# Patient Record
Sex: MALE | Race: WHITE | NOT HISPANIC OR LATINO | Employment: OTHER | URBAN - METROPOLITAN AREA
[De-identification: names, ages, dates, MRNs, and addresses within clinical notes are randomized per-mention and may not be internally consistent; named-entity substitution may affect disease eponyms.]

---

## 2017-08-31 ENCOUNTER — OFFICE VISIT (OUTPATIENT)
Dept: INTERNAL MEDICINE | Facility: CLINIC | Age: 37
End: 2017-08-31
Payer: COMMERCIAL

## 2017-08-31 ENCOUNTER — TELEPHONE (OUTPATIENT)
Dept: INTERNAL MEDICINE | Facility: CLINIC | Age: 37
End: 2017-08-31

## 2017-08-31 ENCOUNTER — HOSPITAL ENCOUNTER (OUTPATIENT)
Dept: RADIOLOGY | Facility: HOSPITAL | Age: 37
Discharge: HOME | End: 2017-08-31
Attending: NURSE PRACTITIONER
Payer: COMMERCIAL

## 2017-08-31 VITALS
HEIGHT: 70 IN | SYSTOLIC BLOOD PRESSURE: 124 MMHG | OXYGEN SATURATION: 99 % | BODY MASS INDEX: 27.3 KG/M2 | WEIGHT: 190.69 LBS | DIASTOLIC BLOOD PRESSURE: 84 MMHG | TEMPERATURE: 97 F | HEART RATE: 74 BPM

## 2017-08-31 DIAGNOSIS — F17.200 TOBACCO DEPENDENCE: ICD-10-CM

## 2017-08-31 DIAGNOSIS — M79.661 PAIN IN RIGHT LOWER LEG: Primary | ICD-10-CM

## 2017-08-31 DIAGNOSIS — Z00.00 ROUTINE CHECK-UP: ICD-10-CM

## 2017-08-31 DIAGNOSIS — M79.661 PAIN IN RIGHT LOWER LEG: ICD-10-CM

## 2017-08-31 PROCEDURE — 3008F BODY MASS INDEX DOCD: CPT | Mod: S$GLB,,, | Performed by: NURSE PRACTITIONER

## 2017-08-31 PROCEDURE — 73590 X-RAY EXAM OF LOWER LEG: CPT | Mod: 26,RT,, | Performed by: RADIOLOGY

## 2017-08-31 PROCEDURE — 99204 OFFICE O/P NEW MOD 45 MIN: CPT | Mod: S$GLB,,, | Performed by: NURSE PRACTITIONER

## 2017-08-31 PROCEDURE — 73590 X-RAY EXAM OF LOWER LEG: CPT | Mod: TC,PO,RT

## 2017-08-31 PROCEDURE — 99999 PR PBB SHADOW E&M-NEW PATIENT-LVL III: CPT | Mod: PBBFAC,,, | Performed by: NURSE PRACTITIONER

## 2017-08-31 RX ORDER — DICLOFENAC SODIUM 75 MG/1
75 TABLET, DELAYED RELEASE ORAL 2 TIMES DAILY PRN
Qty: 60 TABLET | Refills: 0 | Status: SHIPPED | OUTPATIENT
Start: 2017-08-31 | End: 2018-02-22

## 2017-08-31 NOTE — TELEPHONE ENCOUNTER
Notified of the xray results,   Will ice the area and take diclofenac.  If not improved will consider ortho

## 2017-08-31 NOTE — PROGRESS NOTES
"Subjective:      Patient ID: Balta Padilla is a 37 y.o. male.    Chief Complaint: Leg Pain (right shin/swelling)    HPI:  Patient states for the last 3 weeks he has noticed a mild swelling and tenderness to his right lower leg.  He denies any trauma, falls, or injury.  He has taken Aleve at home as he always does for aches and pains.  He is also interested in smoking cessation.   He is seeking a new pcp    No past medical history on file.    Past Surgical History:   Procedure Laterality Date    CARPAL TUNNEL RELEASE Right     knee sx      mensicus repair       No results found for: WBC, HGB, HCT, PLT, CHOL, TRIG, HDL, LDLDIRECT, ALT, AST, NA, K, CL, CREATININE, BUN, CO2, TSH, PSA, INR, GLUF, HGBA1C, MICROALBUR    /84 (BP Location: Left arm, Patient Position: Sitting, BP Method: Medium (Manual))   Pulse 74   Temp 97.4 °F (36.3 °C) (Tympanic)   Ht 5' 10" (1.778 m)   Wt 86.5 kg (190 lb 11.2 oz)   SpO2 99%   BMI 27.36 kg/m²       Review of Systems   Constitutional: Negative for appetite change, chills, diaphoresis and fever.   HENT: Negative for congestion, ear pain, postnasal drip, rhinorrhea, sneezing, sore throat and trouble swallowing.    Eyes: Negative for photophobia, pain and visual disturbance.   Respiratory: Negative for apnea, cough, choking, chest tightness, shortness of breath and wheezing.    Cardiovascular: Negative for chest pain, palpitations and leg swelling.   Gastrointestinal: Negative for abdominal pain, constipation, diarrhea, nausea and vomiting.   Genitourinary: Negative for decreased urine volume, difficulty urinating, dysuria, hematuria and urgency.   Musculoskeletal: Positive for arthralgias. Negative for gait problem, joint swelling and myalgias.   Skin: Negative for rash.   Neurological: Negative for dizziness, tremors, seizures, syncope, weakness, light-headedness, numbness and headaches.   Psychiatric/Behavioral: Negative for agitation, confusion, decreased concentration, " hallucinations and sleep disturbance. The patient is not nervous/anxious.       Objective:     Physical Exam   Constitutional: He is oriented to person, place, and time. He appears well-developed and well-nourished. No distress.   Musculoskeletal:   Normal gait  Right lower anterior leg very mild swelling noted, not TTP, but has pain to the lower leg with foot flexion   Neurological: He is alert and oriented to person, place, and time.   Skin: Skin is warm and dry.   Psychiatric: He has a normal mood and affect. His behavior is normal.     Assessment:      1. Pain in right lower leg    2. Routine check-up    3. Tobacco dependence      Plan:   Pain in right lower leg  -     X-Ray Tibia Fibula 2 View Right; Future; Expected date: 08/31/2017    Routine check-up  -     TSH; Future; Expected date: 08/31/2017  -     CBC auto differential; Future; Expected date: 08/31/2017  -     Lipid panel; Future; Expected date: 08/31/2017  -     Comprehensive metabolic panel; Future; Expected date: 08/31/2017    Tobacco dependence  -     Ambulatory referral to Smoking Cessation Program    Other orders  -     diclofenac (VOLTAREN) 75 MG EC tablet; Take 1 tablet (75 mg total) by mouth 2 (two) times daily as needed.  Dispense: 60 tablet; Refill: 0      Will call the xray result to him.  Will try the above med instead of the aleve, ice the area as much as he can.    Current Outpatient Prescriptions:     aspirin-caffeine 845-65 mg PwPk, Take 1-2 packets by mouth daily as needed., Disp: , Rfl:     DOXYLAMINE SUCCINATE (UNISOM, DOXYLAMINE, ORAL), Take 3 tablets by mouth every evening., Disp: , Rfl:     LORATADINE (CLARITIN ORAL), Take 1 tablet by mouth once daily., Disp: , Rfl:     diclofenac (VOLTAREN) 75 MG EC tablet, Take 1 tablet (75 mg total) by mouth 2 (two) times daily as needed., Disp: 60 tablet, Rfl: 0

## 2017-09-13 ENCOUNTER — LAB VISIT (OUTPATIENT)
Dept: LAB | Facility: HOSPITAL | Age: 37
End: 2017-09-13
Attending: INTERNAL MEDICINE
Payer: COMMERCIAL

## 2017-09-13 DIAGNOSIS — Z00.00 ROUTINE CHECK-UP: ICD-10-CM

## 2017-09-13 LAB
ALBUMIN SERPL BCP-MCNC: 3.4 G/DL
ALP SERPL-CCNC: 79 U/L
ALT SERPL W/O P-5'-P-CCNC: 25 U/L
ANION GAP SERPL CALC-SCNC: 9 MMOL/L
AST SERPL-CCNC: 24 U/L
BASOPHILS # BLD AUTO: 0.03 K/UL
BASOPHILS NFR BLD: 0.4 %
BILIRUB SERPL-MCNC: 0.5 MG/DL
BUN SERPL-MCNC: 15 MG/DL
CALCIUM SERPL-MCNC: 9.1 MG/DL
CHLORIDE SERPL-SCNC: 106 MMOL/L
CHOLEST SERPL-MCNC: 203 MG/DL
CHOLEST/HDLC SERPL: 4.5 {RATIO}
CO2 SERPL-SCNC: 27 MMOL/L
CREAT SERPL-MCNC: 0.9 MG/DL
DIFFERENTIAL METHOD: NORMAL
EOSINOPHIL # BLD AUTO: 0.3 K/UL
EOSINOPHIL NFR BLD: 3.8 %
ERYTHROCYTE [DISTWIDTH] IN BLOOD BY AUTOMATED COUNT: 13.5 %
EST. GFR  (AFRICAN AMERICAN): >60 ML/MIN/1.73 M^2
EST. GFR  (NON AFRICAN AMERICAN): >60 ML/MIN/1.73 M^2
GLUCOSE SERPL-MCNC: 86 MG/DL
HCT VFR BLD AUTO: 43.2 %
HDLC SERPL-MCNC: 45 MG/DL
HDLC SERPL: 22.2 %
HGB BLD-MCNC: 14.6 G/DL
LDLC SERPL CALC-MCNC: 136.6 MG/DL
LYMPHOCYTES # BLD AUTO: 2.1 K/UL
LYMPHOCYTES NFR BLD: 28.6 %
MCH RBC QN AUTO: 30.9 PG
MCHC RBC AUTO-ENTMCNC: 33.8 G/DL
MCV RBC AUTO: 91 FL
MONOCYTES # BLD AUTO: 0.7 K/UL
MONOCYTES NFR BLD: 9.8 %
NEUTROPHILS # BLD AUTO: 4.2 K/UL
NEUTROPHILS NFR BLD: 57.1 %
NONHDLC SERPL-MCNC: 158 MG/DL
PLATELET # BLD AUTO: 320 K/UL
PMV BLD AUTO: 10.7 FL
POTASSIUM SERPL-SCNC: 4.4 MMOL/L
PROT SERPL-MCNC: 6.8 G/DL
RBC # BLD AUTO: 4.73 M/UL
SODIUM SERPL-SCNC: 142 MMOL/L
TRIGL SERPL-MCNC: 107 MG/DL
TSH SERPL DL<=0.005 MIU/L-ACNC: 0.99 UIU/ML
WBC # BLD AUTO: 7.32 K/UL

## 2017-09-13 PROCEDURE — 36415 COLL VENOUS BLD VENIPUNCTURE: CPT | Mod: PO

## 2017-09-13 PROCEDURE — 80061 LIPID PANEL: CPT

## 2017-09-13 PROCEDURE — 84443 ASSAY THYROID STIM HORMONE: CPT

## 2017-09-13 PROCEDURE — 80053 COMPREHEN METABOLIC PANEL: CPT

## 2017-09-13 PROCEDURE — 85025 COMPLETE CBC W/AUTO DIFF WBC: CPT

## 2017-09-20 ENCOUNTER — OFFICE VISIT (OUTPATIENT)
Dept: INTERNAL MEDICINE | Facility: CLINIC | Age: 37
End: 2017-09-20
Payer: COMMERCIAL

## 2017-09-20 VITALS
DIASTOLIC BLOOD PRESSURE: 82 MMHG | OXYGEN SATURATION: 100 % | WEIGHT: 186.5 LBS | HEIGHT: 70 IN | HEART RATE: 75 BPM | BODY MASS INDEX: 26.7 KG/M2 | SYSTOLIC BLOOD PRESSURE: 124 MMHG | TEMPERATURE: 96 F

## 2017-09-20 DIAGNOSIS — Z00.00 ROUTINE GENERAL MEDICAL EXAMINATION AT A HEALTH CARE FACILITY: Primary | ICD-10-CM

## 2017-09-20 DIAGNOSIS — F17.200 TOBACCO DEPENDENCE: ICD-10-CM

## 2017-09-20 PROCEDURE — 99395 PREV VISIT EST AGE 18-39: CPT | Mod: S$GLB,,, | Performed by: INTERNAL MEDICINE

## 2017-09-20 PROCEDURE — 99999 PR PBB SHADOW E&M-EST. PATIENT-LVL III: CPT | Mod: PBBFAC,,, | Performed by: INTERNAL MEDICINE

## 2017-09-20 NOTE — PROGRESS NOTES
"HPI:  Pt comes for his initial visit to establish care. He has no complaints. He has no significant PMHx.       Current MEDS: medcard review, verified and update  Allergies: Per the electronic medical record    History reviewed. No pertinent past medical history.    Past Surgical History:   Procedure Laterality Date    CARPAL TUNNEL RELEASE Right     knee sx      mensicus repair       SHx: per the electronic medical record    FHx: recorded in the electronic medical record    ROS:    denies any chest pains or shortness of breath. Denies any nausea, vomiting or diarrhea. Denies any fever, chills or sweats. Denies any change in weight, voice, stool, skin or hair. Denies any dysuria, dyspepsia or dysphagia. Denies any change in vision, hearing or headaches. Denies any swollen lymph nodes or loss of memory.    PE:  /82   Pulse 75   Temp 96.4 °F (35.8 °C) (Tympanic)   Ht 5' 10" (1.778 m)   Wt 84.6 kg (186 lb 8.2 oz)   SpO2 100%   BMI 26.76 kg/m²   Gen: Well-developed, well-nourished, male, in no acute distress, oriented x3  HEENT: neck is supple, no adenopathy, carotids 2+ equal without bruits, thyroid exam normal size without nodules.  CHEST: clear to auscultation and percussion  CVS: regular rate and rhythm without significant murmur, gallop, or rubs  ABD: soft, benign, no rebound no guarding, no distention.  Bowel sounds are normal.     nontender.  No palpable masses.  No organomegaly and no audible bruits.  RECTAL: deferred  EXT: no clubbing, cyanosis, or edema  LYMPH: no cervical, inguinal, or axillary adenopathy  FEET: no loss of sensation.  No ulcers or pressure sores.  NEURO: gait normal.  Cranial nerves II- XII intact. No nystagmus.  Speech normal.   Gross motor and sensory unremarkable.    Lab Results   Component Value Date    WBC 7.32 09/13/2017    HGB 14.6 09/13/2017    HCT 43.2 09/13/2017     09/13/2017    CHOL 203 (H) 09/13/2017    TRIG 107 09/13/2017    HDL 45 09/13/2017    ALT 25 " 09/13/2017    AST 24 09/13/2017     09/13/2017    K 4.4 09/13/2017     09/13/2017    CREATININE 0.9 09/13/2017    BUN 15 09/13/2017    CO2 27 09/13/2017    TSH 0.994 09/13/2017       Impression:  Healthy 36 y/o male    Plan:      See me in three years for regular PE or o/w as needed.

## 2017-09-20 NOTE — LETTER
September 20, 2017      Juan Mora, NALLELY  [address] [facility] [id number] [place] [address] Internal [facility]  [address] [facility] [address]  Phone: [phone]          Patient: Balta Padilla   MR Number: [id number]   YOB: 1980   Date of Visit: 9/20/2017       Dear Juan Mora:    Thank you for referring Balta Padilla to me for evaluation. Attached you will find relevant portions of my assessment and plan of care.    If you have questions, please do not hesitate to call me. I look forward to following Balta Padilla along with you.    Sincerely,    Royal Dunham MD    Enclosure  CC:  No Recipients    If you would like to receive this communication electronically, please contact [email or web address] or [phone] to request more information on [employer] Link access.    For providers and/or their staff who would like to refer a patient to [facility], please contact us through our one-stop-shop provider referral line, [facility], at [phone].    If you feel you have received this communication in error or would no longer like to receive these types of communications, please e-mail [email or web address]

## 2018-02-22 ENCOUNTER — OFFICE VISIT (OUTPATIENT)
Dept: INTERNAL MEDICINE | Facility: CLINIC | Age: 38
End: 2018-02-22
Payer: COMMERCIAL

## 2018-02-22 VITALS
SYSTOLIC BLOOD PRESSURE: 108 MMHG | BODY MASS INDEX: 26.35 KG/M2 | OXYGEN SATURATION: 99 % | DIASTOLIC BLOOD PRESSURE: 59 MMHG | HEIGHT: 70 IN | TEMPERATURE: 97 F | WEIGHT: 184.06 LBS | HEART RATE: 84 BPM

## 2018-02-22 DIAGNOSIS — F32.9 REACTIVE DEPRESSION: ICD-10-CM

## 2018-02-22 PROBLEM — F32.A DEPRESSION: Status: ACTIVE | Noted: 2018-02-22

## 2018-02-22 PROCEDURE — 99999 PR PBB SHADOW E&M-EST. PATIENT-LVL III: CPT | Mod: PBBFAC,,, | Performed by: NURSE PRACTITIONER

## 2018-02-22 PROCEDURE — 99214 OFFICE O/P EST MOD 30 MIN: CPT | Mod: S$GLB,,, | Performed by: NURSE PRACTITIONER

## 2018-02-22 PROCEDURE — 3008F BODY MASS INDEX DOCD: CPT | Mod: S$GLB,,, | Performed by: NURSE PRACTITIONER

## 2018-02-22 RX ORDER — ESCITALOPRAM OXALATE 10 MG/1
10 TABLET ORAL DAILY
Qty: 30 TABLET | Refills: 11 | Status: SHIPPED | OUTPATIENT
Start: 2018-02-22 | End: 2018-10-29
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
## 2018-03-01 ENCOUNTER — PATIENT OUTREACH (OUTPATIENT)
Dept: ADMINISTRATIVE | Facility: HOSPITAL | Age: 38
End: 2018-03-01

## 2018-10-10 ENCOUNTER — OFFICE VISIT (OUTPATIENT)
Dept: INTERNAL MEDICINE | Facility: CLINIC | Age: 38
End: 2018-10-10
Payer: COMMERCIAL

## 2018-10-10 VITALS
HEIGHT: 70 IN | TEMPERATURE: 98 F | SYSTOLIC BLOOD PRESSURE: 110 MMHG | BODY MASS INDEX: 26.22 KG/M2 | OXYGEN SATURATION: 99 % | DIASTOLIC BLOOD PRESSURE: 80 MMHG | WEIGHT: 183.19 LBS | HEART RATE: 83 BPM

## 2018-10-10 DIAGNOSIS — M79.5 FOREIGN BODY (FB) IN SOFT TISSUE: Primary | ICD-10-CM

## 2018-10-10 PROCEDURE — 3008F BODY MASS INDEX DOCD: CPT | Mod: CPTII,S$GLB,, | Performed by: INTERNAL MEDICINE

## 2018-10-10 PROCEDURE — 99999 PR PBB SHADOW E&M-EST. PATIENT-LVL III: CPT | Mod: PBBFAC,,, | Performed by: INTERNAL MEDICINE

## 2018-10-10 PROCEDURE — 99213 OFFICE O/P EST LOW 20 MIN: CPT | Mod: S$GLB,,, | Performed by: INTERNAL MEDICINE
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
## 2018-10-15 ENCOUNTER — OFFICE VISIT (OUTPATIENT)
Dept: SURGERY | Facility: CLINIC | Age: 38
End: 2018-10-15
Payer: COMMERCIAL

## 2018-10-15 VITALS
TEMPERATURE: 99 F | BODY MASS INDEX: 24.74 KG/M2 | HEIGHT: 70 IN | HEART RATE: 95 BPM | DIASTOLIC BLOOD PRESSURE: 62 MMHG | WEIGHT: 172.81 LBS | SYSTOLIC BLOOD PRESSURE: 123 MMHG

## 2018-10-15 DIAGNOSIS — S50.852A FOREIGN BODY IN LEFT FOREARM, INITIAL ENCOUNTER: Primary | ICD-10-CM

## 2018-10-15 PROCEDURE — 99999 PR PBB SHADOW E&M-EST. PATIENT-LVL III: CPT | Mod: PBBFAC,,, | Performed by: SURGERY

## 2018-10-15 PROCEDURE — 3008F BODY MASS INDEX DOCD: CPT | Mod: CPTII,S$GLB,, | Performed by: SURGERY

## 2018-10-15 PROCEDURE — 99203 OFFICE O/P NEW LOW 30 MIN: CPT | Mod: S$GLB,,, | Performed by: SURGERY

## 2018-10-15 RX ORDER — NAPROXEN SODIUM 220 MG
220 TABLET ORAL
COMMUNITY
End: 2019-01-29
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
## 2018-10-15 NOTE — LETTER
October 15, 2018      Royal Dunham MD  [address]  [address]  [place] [address]           [facility]  [address] [place] [address]  Phone: [phone]  Fax: [phone]          Patient: Pravin Padilla   MR Number: [id number]   YOB: 1980   Date of Visit: 10/15/2018       Dear Dr. Royal Dunham:    Thank you for referring Pravin Padilla to me for evaluation. Attached you will find relevant portions of my assessment and plan of care.    If you have questions, please do not hesitate to call me. I look forward to following Pravin Padilla along with you.    Sincerely,    Enrique Dugan MD    Enclosure  CC:  No Recipients    If you would like to receive this communication electronically, please contact [email or web address] or [phone] to request more information on [employer] Link access.    For providers and/or their staff who would like to refer a patient to [facility], please contact us through our one-stop-shop provider referral line, [facility], at [phone].    If you feel you have received this communication in error or would no longer like to receive these types of communications, please e-mail [email or web address]

## 2018-10-29 ENCOUNTER — PROCEDURE VISIT (OUTPATIENT)
Dept: SURGERY | Facility: CLINIC | Age: 38
End: 2018-10-29
Payer: COMMERCIAL

## 2018-10-29 VITALS
HEIGHT: 70 IN | BODY MASS INDEX: 26.33 KG/M2 | WEIGHT: 183.88 LBS | SYSTOLIC BLOOD PRESSURE: 129 MMHG | TEMPERATURE: 98 F | DIASTOLIC BLOOD PRESSURE: 79 MMHG | HEART RATE: 98 BPM

## 2018-10-29 DIAGNOSIS — M79.5 FOREIGN BODY (FB) IN SOFT TISSUE: Primary | ICD-10-CM

## 2018-10-29 PROCEDURE — 10121 INC&RMVL FB SUBQ TISS COMP: CPT | Mod: S$GLB,,, | Performed by: SURGERY

## 2018-10-29 NOTE — PROCEDURES
"Exc, Foreign Body  Date/Time: 10/29/2018 2:15 PM  Performed by: Enrique Dugan MD  Authorized by: Enrique Dugan MD     Consent Done?:  Yes (Written)  Time out: Immediately prior to procedure a "time out" was called to verify the correct patient, procedure, equipment, support staff and site/side marked as required.      STAFF:  Assistants?: No    I was present for the entire procedure.  INDICATIONS:: Foreign body.  LOCATION:  Body area:  Lower arm / wristleft    PREP:  Patient was prepped and draped in the normal sterile fashion.Position:  Supine    ANESTHESIA:  Anesthesia:  Local infiltration  Local anesthetic:  Lidocaine 1% with epinephrine    PROCEDURE DETAILS:  Excision type:  Foreign body removal  Excision size (cm):  2  Scalpel size:  15  Incision type:  Single straight  Specimens?: No      Hemostasis was obtained.  Estimated blood loss (cc):  2  Wound closure:  Intermediate layered  Wound repair size (cm):  2  Sutures:  3-0 Vicryl and 4-0 Monocryl  Sterile dressings:  Gauze, Mastisol, Steri-strips and Tegaderm  Post-op diagnosis: Same as pre-op diagnosis.  Needle, instrument, and sponge counts were correct.  Patient tolerated the procedure well with no immediate complications.  Post-operative instructions were provided for the patient.  Patient was discharged and will follow up for wound check.      "

## 2019-01-29 ENCOUNTER — OFFICE VISIT (OUTPATIENT)
Dept: INTERNAL MEDICINE | Facility: CLINIC | Age: 39
End: 2019-01-29
Payer: COMMERCIAL

## 2019-01-29 ENCOUNTER — HOSPITAL ENCOUNTER (OUTPATIENT)
Dept: RADIOLOGY | Facility: HOSPITAL | Age: 39
Discharge: HOME | End: 2019-01-29
Attending: FAMILY MEDICINE
Payer: COMMERCIAL

## 2019-01-29 VITALS
HEART RATE: 94 BPM | OXYGEN SATURATION: 98 % | WEIGHT: 195.56 LBS | SYSTOLIC BLOOD PRESSURE: 136 MMHG | TEMPERATURE: 99 F | DIASTOLIC BLOOD PRESSURE: 84 MMHG | HEIGHT: 70 IN | BODY MASS INDEX: 28 KG/M2

## 2019-01-29 DIAGNOSIS — S61.012D LACERATION OF LEFT THUMB WITHOUT FOREIGN BODY WITHOUT DAMAGE TO NAIL, SUBSEQUENT ENCOUNTER: ICD-10-CM

## 2019-01-29 DIAGNOSIS — S61.012D LACERATION OF LEFT THUMB WITHOUT FOREIGN BODY WITHOUT DAMAGE TO NAIL, SUBSEQUENT ENCOUNTER: Primary | ICD-10-CM

## 2019-01-29 PROCEDURE — 99999 PR PBB SHADOW E&M-EST. PATIENT-LVL III: ICD-10-PCS | Mod: PBBFAC,,, | Performed by: FAMILY MEDICINE

## 2019-01-29 PROCEDURE — 3008F BODY MASS INDEX DOCD: CPT | Mod: CPTII,S$GLB,, | Performed by: FAMILY MEDICINE

## 2019-01-29 PROCEDURE — 73140 X-RAY EXAM OF FINGER(S): CPT | Mod: 26,LT,, | Performed by: RADIOLOGY

## 2019-01-29 PROCEDURE — 73140 XR FINGER 2 OR MORE VIEWS LEFT: ICD-10-PCS | Mod: 26,LT,, | Performed by: RADIOLOGY

## 2019-01-29 PROCEDURE — 73140 X-RAY EXAM OF FINGER(S): CPT | Mod: TC,PO,LT

## 2019-01-29 PROCEDURE — 3008F PR BODY MASS INDEX (BMI) DOCUMENTED: ICD-10-PCS | Mod: CPTII,S$GLB,, | Performed by: FAMILY MEDICINE

## 2019-01-29 PROCEDURE — 99213 PR OFFICE/OUTPT VISIT, EST, LEVL III, 20-29 MIN: ICD-10-PCS | Mod: S$GLB,,, | Performed by: FAMILY MEDICINE

## 2019-01-29 PROCEDURE — 99213 OFFICE O/P EST LOW 20 MIN: CPT | Mod: S$GLB,,, | Performed by: FAMILY MEDICINE

## 2019-01-29 PROCEDURE — 99999 PR PBB SHADOW E&M-EST. PATIENT-LVL III: CPT | Mod: PBBFAC,,, | Performed by: FAMILY MEDICINE

## 2019-01-29 RX ORDER — HYDROCODONE BITARTRATE AND ACETAMINOPHEN 10; 325 MG/1; MG/1
1 TABLET ORAL EVERY 6 HOURS PRN
Qty: 18 TABLET | Refills: 0 | Status: SHIPPED | OUTPATIENT
Start: 2019-01-29 | End: 2019-02-05 | Stop reason: SDUPTHER

## 2019-01-29 RX ORDER — CEPHALEXIN 500 MG/1
500 CAPSULE ORAL
COMMUNITY
Start: 2019-01-24 | End: 2019-02-03

## 2019-01-29 NOTE — PROGRESS NOTES
Subjective:      Patient ID: Pravin Padilla is a 38 y.o. male.    Chief Complaint: Wound on finger      Patient here today for follow up on ER visit for left thumb laceration to fat pad of thumb. He was seen 5 days ago after injury using skill saw, wound was cleaned thoroughly and cauterized with silver nitrate. Currently taking cephalexin without any problems, however having severe pain in thumb and hand down to wrist.      Review of Systems   Constitutional: Negative for fever.   Skin: Positive for color change (skin black from cauterization) and wound.     History reviewed. No pertinent past medical history.  Past Surgical History:   Procedure Laterality Date    CARPAL TUNNEL RELEASE Right     knee sx      mensicus repair     Family History   Problem Relation Age of Onset    Diabetes Mother     Prostate cancer Maternal Grandfather      Social History     Socioeconomic History    Marital status:      Spouse name: Not on file    Number of children: Not on file    Years of education: Not on file    Highest education level: Not on file   Social Needs    Financial resource strain: Not on file    Food insecurity - worry: Not on file    Food insecurity - inability: Not on file    Transportation needs - medical: Not on file    Transportation needs - non-medical: Not on file   Occupational History    Not on file   Tobacco Use    Smoking status: Current Every Day Smoker     Packs/day: 1.00     Years: 25.00     Pack years: 25.00     Types: Cigarettes     Start date: 1/31/1992    Smokeless tobacco: Never Used   Substance and Sexual Activity    Alcohol use: Yes     Alcohol/week: 9.0 oz     Types: 15 Shots of liquor per week     Comment: social weekly 4 wiskey    Drug use: No    Sexual activity: Yes     Partners: Female   Other Topics Concern    Not on file   Social History Narrative    Not on file     Review of patient's allergies indicates:  No Known Allergies    Objective:       /84  "(BP Location: Right arm)   Pulse 94   Temp 99.4 °F (37.4 °C) (Tympanic)   Ht 5' 9.5" (1.765 m)   Wt 88.7 kg (195 lb 8.8 oz)   SpO2 98%   BMI 28.46 kg/m²   Physical Exam   Constitutional: He appears well-developed and well-nourished. No distress.   Musculoskeletal: Normal range of motion. He exhibits tenderness (entire left thumb) and deformity.   Neurological: No sensory deficit (sensation intact in thumb).   Skin: He is not diaphoretic.   No erythema or signs of infection  Skin black from cauterization   Vitals reviewed.    Assessment:     1. Laceration of left thumb without foreign body without damage to nail, subsequent encounter      Plan:   Laceration of left thumb without foreign body without damage to nail, subsequent encounter  -     X-Ray Finger 2 or More Views Left; Future; Expected date: 01/29/2019    Other orders  -     HYDROcodone-acetaminophen (NORCO)  mg per tablet; Take 1 tablet by mouth every 6 (six) hours as needed for Pain.  Dispense: 18 tablet; Refill: 0         Medication List           Accurate as of 1/29/19  3:51 PM. If you have any questions, ask your nurse or doctor.               START taking these medications    HYDROcodone-acetaminophen  mg per tablet  Commonly known as:  NORCO  Take 1 tablet by mouth every 6 (six) hours as needed for Pain.  Started by:  Cecile Knowles MD        CONTINUE taking these medications    aspirin-caffeine 845-65 mg Pwpk     cephALEXin 500 MG capsule  Commonly known as:  KEFLEX     UNISOM (DOXYLAMINE) ORAL        STOP taking these medications    CLARITIN ORAL  Stopped by:  Cecile Knowles MD     naproxen sodium 220 MG tablet  Commonly known as:  ANAPROX  Stopped by:  Cecile Knowles MD           Where to Get Your Medications      These medications were sent to [facility] [address] [facility] [address] [facility] [address] [facility]  [address], [facility] [address]    Phone:  " [phone]   · HYDROcodone-acetaminophen  mg per tablet

## 2019-02-05 ENCOUNTER — OFFICE VISIT (OUTPATIENT)
Dept: INTERNAL MEDICINE | Facility: CLINIC | Age: 39
End: 2019-02-05
Payer: COMMERCIAL

## 2019-02-05 VITALS
OXYGEN SATURATION: 98 % | SYSTOLIC BLOOD PRESSURE: 120 MMHG | HEIGHT: 69 IN | DIASTOLIC BLOOD PRESSURE: 73 MMHG | BODY MASS INDEX: 28.54 KG/M2 | TEMPERATURE: 98 F | HEART RATE: 87 BPM | WEIGHT: 192.69 LBS

## 2019-02-05 DIAGNOSIS — L03.90 CELLULITIS, UNSPECIFIED CELLULITIS SITE: ICD-10-CM

## 2019-02-05 DIAGNOSIS — S61.012D LACERATION OF LEFT THUMB WITHOUT FOREIGN BODY WITHOUT DAMAGE TO NAIL, SUBSEQUENT ENCOUNTER: Primary | ICD-10-CM

## 2019-02-05 PROCEDURE — 99999 PR PBB SHADOW E&M-EST. PATIENT-LVL III: ICD-10-PCS | Mod: PBBFAC,,, | Performed by: FAMILY MEDICINE

## 2019-02-05 PROCEDURE — 99999 PR PBB SHADOW E&M-EST. PATIENT-LVL III: CPT | Mod: PBBFAC,,, | Performed by: FAMILY MEDICINE

## 2019-02-05 PROCEDURE — 99213 OFFICE O/P EST LOW 20 MIN: CPT | Mod: S$GLB,,, | Performed by: FAMILY MEDICINE

## 2019-02-05 PROCEDURE — 99213 PR OFFICE/OUTPT VISIT, EST, LEVL III, 20-29 MIN: ICD-10-PCS | Mod: S$GLB,,, | Performed by: FAMILY MEDICINE

## 2019-02-05 PROCEDURE — 3008F BODY MASS INDEX DOCD: CPT | Mod: CPTII,S$GLB,, | Performed by: FAMILY MEDICINE

## 2019-02-05 PROCEDURE — 3008F PR BODY MASS INDEX (BMI) DOCUMENTED: ICD-10-PCS | Mod: CPTII,S$GLB,, | Performed by: FAMILY MEDICINE

## 2019-02-05 RX ORDER — MUPIROCIN 20 MG/G
OINTMENT TOPICAL 2 TIMES DAILY
Qty: 30 G | Refills: 1 | Status: SHIPPED | OUTPATIENT
Start: 2019-02-05 | End: 2019-04-01

## 2019-02-05 RX ORDER — SULFAMETHOXAZOLE AND TRIMETHOPRIM 800; 160 MG/1; MG/1
1 TABLET ORAL 2 TIMES DAILY
Qty: 20 TABLET | Refills: 0 | Status: SHIPPED | OUTPATIENT
Start: 2019-02-05 | End: 2019-04-01

## 2019-02-05 RX ORDER — HYDROCODONE BITARTRATE AND ACETAMINOPHEN 10; 325 MG/1; MG/1
1 TABLET ORAL EVERY 6 HOURS PRN
Qty: 18 TABLET | Refills: 0 | Status: SHIPPED | OUTPATIENT
Start: 2019-02-05 | End: 2019-04-01

## 2019-02-05 NOTE — PROGRESS NOTES
Subjective:      Patient ID: Pravin Padilla is a 38 y.o. male.    Chief Complaint: Hand Pain (left thumb infection)      Patient reports his finger may be getting infected. Cauterized area broke up and so now has open wound on thumb, starting to have more pain and swelling, occasionally can have white milky drainage from open wound. He has not been keeping it covered, was told in the ER to keep wound open and dry.       Review of Systems   Constitutional: Negative for fatigue and fever.   Skin: Positive for color change and wound.     History reviewed. No pertinent past medical history.  Past Surgical History:   Procedure Laterality Date    CARPAL TUNNEL RELEASE Right     knee sx      mensicus repair     Family History   Problem Relation Age of Onset    Diabetes Mother     Prostate cancer Maternal Grandfather      Social History     Socioeconomic History    Marital status:      Spouse name: Not on file    Number of children: Not on file    Years of education: Not on file    Highest education level: Not on file   Social Needs    Financial resource strain: Not on file    Food insecurity - worry: Not on file    Food insecurity - inability: Not on file    Transportation needs - medical: Not on file    Transportation needs - non-medical: Not on file   Occupational History    Not on file   Tobacco Use    Smoking status: Current Every Day Smoker     Packs/day: 1.00     Years: 25.00     Pack years: 25.00     Types: Cigarettes     Start date: 1/31/1992    Smokeless tobacco: Never Used   Substance and Sexual Activity    Alcohol use: Yes     Alcohol/week: 9.0 oz     Types: 15 Shots of liquor per week     Comment: social weekly 4 wiskey    Drug use: No    Sexual activity: Yes     Partners: Female   Other Topics Concern    Not on file   Social History Narrative    Not on file     Review of patient's allergies indicates:  No Known Allergies    Objective:       /73 (BP Location: Right arm,  "Patient Position: Sitting, BP Method: Medium (Automatic))   Pulse 87   Temp 98.3 °F (36.8 °C) (Tympanic)   Ht 5' 9.25" (1.759 m)   Wt 87.4 kg (192 lb 10.9 oz)   SpO2 98%   BMI 28.25 kg/m²   Physical Exam   Constitutional: He appears well-developed and well-nourished. No distress.   Skin: He is not diaphoretic.   Thumb with swelling, mild erythema just proximal to the wound. No purulent discharge able to be expressed today.    Vitals reviewed.    Assessment:     1. Laceration of left thumb without foreign body without damage to nail, subsequent encounter    2. Cellulitis, unspecified cellulitis site      Plan:   Laceration of left thumb without foreign body without damage to nail, subsequent encounter    Cellulitis, unspecified cellulitis site    Other orders  -     sulfamethoxazole-trimethoprim 800-160mg (BACTRIM DS) 800-160 mg Tab; Take 1 tablet by mouth 2 (two) times daily.  Dispense: 20 tablet; Refill: 0  -     HYDROcodone-acetaminophen (NORCO)  mg per tablet; Take 1 tablet by mouth every 6 (six) hours as needed for Pain.  Dispense: 18 tablet; Refill: 0  -     mupirocin (BACTROBAN) 2 % ointment; Apply topically 2 (two) times daily.  Dispense: 30 g; Refill: 1    Warm salt water soaks, keep wound covered with antibiotic ointment and bandage especially when at work.   Medication List with Changes/Refills   New Medications    MUPIROCIN (BACTROBAN) 2 % OINTMENT    Apply topically 2 (two) times daily.    SULFAMETHOXAZOLE-TRIMETHOPRIM 800-160MG (BACTRIM DS) 800-160 MG TAB    Take 1 tablet by mouth 2 (two) times daily.   Current Medications    ASPIRIN-CAFFEINE 845-65 MG PWPK    Take 1-2 packets by mouth daily as needed.    DOXYLAMINE SUCCINATE (UNISOM, DOXYLAMINE, ORAL)    Take 3 tablets by mouth every evening.   Changed and/or Refilled Medications    Modified Medication Previous Medication    HYDROCODONE-ACETAMINOPHEN (NORCO)  MG PER TABLET HYDROcodone-acetaminophen (NORCO)  mg per tablet       " Take 1 tablet by mouth every 6 (six) hours as needed for Pain.    Take 1 tablet by mouth every 6 (six) hours as needed for Pain.

## 2019-04-01 ENCOUNTER — OFFICE VISIT (OUTPATIENT)
Dept: INTERNAL MEDICINE | Facility: CLINIC | Age: 39
End: 2019-04-01
Payer: COMMERCIAL

## 2019-04-01 ENCOUNTER — HOSPITAL ENCOUNTER (OUTPATIENT)
Dept: RADIOLOGY | Facility: HOSPITAL | Age: 39
Discharge: HOME | End: 2019-04-01
Attending: FAMILY MEDICINE
Payer: COMMERCIAL

## 2019-04-01 VITALS
SYSTOLIC BLOOD PRESSURE: 118 MMHG | RESPIRATION RATE: 16 BRPM | DIASTOLIC BLOOD PRESSURE: 70 MMHG | HEART RATE: 100 BPM | TEMPERATURE: 98 F | OXYGEN SATURATION: 98 % | WEIGHT: 196.44 LBS | HEIGHT: 69 IN | BODY MASS INDEX: 29.09 KG/M2

## 2019-04-01 DIAGNOSIS — M25.442 SWELLING OF HAND JOINT, LEFT: ICD-10-CM

## 2019-04-01 DIAGNOSIS — Z53.21 PROCEDURE AND TREATMENT NOT CARRIED OUT DUE TO PATIENT LEAVING PRIOR TO BEING SEEN BY HEALTH CARE PROVIDER: Primary | ICD-10-CM

## 2019-04-01 DIAGNOSIS — M10.9 ACUTE GOUT OF LEFT HAND, UNSPECIFIED CAUSE: Primary | ICD-10-CM

## 2019-04-01 PROCEDURE — 73130 X-RAY EXAM OF HAND: CPT | Mod: TC,PO,LT

## 2019-04-01 PROCEDURE — 99213 OFFICE O/P EST LOW 20 MIN: CPT | Mod: S$GLB,,, | Performed by: FAMILY MEDICINE

## 2019-04-01 PROCEDURE — 99499 UNLISTED E&M SERVICE: CPT | Mod: S$GLB,,, | Performed by: FAMILY MEDICINE

## 2019-04-01 PROCEDURE — 73130 X-RAY EXAM OF HAND: CPT | Mod: 26,LT,, | Performed by: RADIOLOGY

## 2019-04-01 PROCEDURE — 3008F BODY MASS INDEX DOCD: CPT | Mod: CPTII,S$GLB,, | Performed by: FAMILY MEDICINE

## 2019-04-01 PROCEDURE — 99999 PR PBB SHADOW E&M-EST. PATIENT-LVL IV: CPT | Mod: PBBFAC,,, | Performed by: FAMILY MEDICINE

## 2019-04-01 PROCEDURE — 73130 XR HAND COMPLETE 3 VIEW LEFT: ICD-10-PCS | Mod: 26,LT,, | Performed by: RADIOLOGY

## 2019-04-01 PROCEDURE — 99213 PR OFFICE/OUTPT VISIT, EST, LEVL III, 20-29 MIN: ICD-10-PCS | Mod: S$GLB,,, | Performed by: FAMILY MEDICINE

## 2019-04-01 PROCEDURE — 99999 PR PBB SHADOW E&M-EST. PATIENT-LVL IV: ICD-10-PCS | Mod: PBBFAC,,, | Performed by: FAMILY MEDICINE

## 2019-04-01 PROCEDURE — 99499 NO LOS: ICD-10-PCS | Mod: S$GLB,,, | Performed by: FAMILY MEDICINE

## 2019-04-01 PROCEDURE — 3008F PR BODY MASS INDEX (BMI) DOCUMENTED: ICD-10-PCS | Mod: CPTII,S$GLB,, | Performed by: FAMILY MEDICINE

## 2019-04-01 RX ORDER — INDOMETHACIN 75 MG/1
75 CAPSULE, EXTENDED RELEASE ORAL 2 TIMES DAILY WITH MEALS
Qty: 20 CAPSULE | Refills: 0 | Status: SHIPPED | OUTPATIENT
Start: 2019-04-01 | End: 2023-09-08

## 2019-04-01 RX ORDER — LORATADINE 10 MG/1
10 TABLET ORAL DAILY
COMMUNITY
End: 2023-09-08

## 2019-04-01 RX ORDER — COLCHICINE 0.6 MG/1
1.2 TABLET ORAL ONCE
Qty: 3 TABLET | Refills: 0 | Status: SHIPPED | OUTPATIENT
Start: 2019-04-01 | End: 2023-09-08

## 2019-04-01 RX ORDER — METHYLPREDNISOLONE 4 MG/1
TABLET ORAL
Qty: 1 PACKAGE | Refills: 0 | Status: SHIPPED | OUTPATIENT
Start: 2019-04-01 | End: 2019-04-22

## 2019-04-01 NOTE — PATIENT INSTRUCTIONS
Come back for uric acid blood test in about 6-8 weeks.      Gout    Gout is an inflammation of a joint due to a build-up of gout crystals in the joint fluid. This occurs when there is an excess of uric acid (a normal waste product) in the body. Uric acid builds up in the body when the kidneys are unable to filter enough of it from the blood. This may occur with age. It is also associated with kidney disease. Gout occurs more often in people with obesity, diabetes, high blood pressure, or high levels of fats in the blood. It may run in families. Gout tends to come and go. A flare up of gout is called an attack. Drinking alcohol or eating certain foods (such as shellfish or foods with additives such as high-fructose corn syrup) may increase uric acid levels in the blood and cause a gout attack.  During a gout attack, the affected joint may become a hot, red, swollen and painful. If you have had one attack of gout, you are likely to have another. An attack of gout can be treated with medicine. If these attacks become frequent, a daily medicine may be prescribed to help the kidneys remove uric acid from the body.  Home care  During a gout attack:  · Rest painful joints. If gout affects the joints of your foot or leg, you may want to use crutches for the first few days to keep from bearing weight on the affected joint.  · When sitting or lying down, raise the painful joint to a level higher than your heart.  · Apply an ice pack (ice cubes in a plastic bag wrapped in a thin towel) over the injured area for 20 minutes every 1 to 2 hours the first day for pain relief. Continue this 3 to 4 times a day for swelling and pain.  · Avoid alcohol and foods listed below (see Preventing attacks) during a gout attack. Drink extra fluid to help flush the uric acid through your kidneys.  · If you were prescribed a medicine to treat gout, take it as your healthcare provider has instructed. Don't skip  doses.  · Take anti-inflammatory medicine as directed.   · If pain medicines have been prescribed, take them exactly as directed.    Preventing attacks  · Minimize or avoid alcohol use. Excess alcohol intake can cause a gout attack.  · Limit these foods and beverages:  ¨ Organ meats, such as kidneys and liver  ¨ Certain seafoods (anchovies, sardines, shrimp, scallops, herring, mackerel)  ¨ Wild game, meat extracts and meat gravies  ¨ Foods and beverages sweetened with high-fructose corn syrup, such as sodas  · Eat a healthy diet including low-fat and nonfat dairy, whole grains, and vegetables.  · If you are overweight, talk to your healthcare provider about a weight reduction plan. Avoid fasting or extreme low calorie diets (less than 900 calories per day). This will increase uric acid levels in the body.  · If you have diabetes or high blood pressure, work with your doctor to manage these conditions.  · Protect the joint from injury. Trauma can trigger a gout attack.  Follow-up care  Follow up with your healthcare provider, or as advised.   When to seek medical advice  Call your healthcare provider if you have any of the following:  · Fever over 100.4°F (38.ºC) with worsening joint pain  · Increasing redness around the joint  · Pain developing in another joint  · Repeated vomiting, abdominal pain, or blood in the vomit or stool (black or red color)  Date Last Reviewed: 3/1/2017  © 4067-0087 [employer]. [address] [place] [address], [place] [address]. All rights reserved. This information is not intended as a substitute for professional medical care. Always follow your healthcare professional's instructions.

## 2019-04-01 NOTE — PROGRESS NOTES
Subjective:      Patient ID: Pravin Padilla is a 39 y.o. male.    Chief Complaint: swollen hand      Patient reports he woke up this morning with pain and swelling of left hand. No known injury or trauma to the area. It was feeling fine yesterday.    Review of Systems   Musculoskeletal: Positive for arthralgias and joint swelling.   Skin: Positive for color change. Negative for wound.     History reviewed. No pertinent past medical history.  Past Surgical History:   Procedure Laterality Date    CARPAL TUNNEL RELEASE Right     knee sx      mensicus repair     Family History   Problem Relation Age of Onset    Diabetes Mother     Prostate cancer Maternal Grandfather      Social History     Socioeconomic History    Marital status:      Spouse name: Not on file    Number of children: Not on file    Years of education: Not on file    Highest education level: Not on file   Occupational History    Not on file   Social Needs    Financial resource strain: Not on file    Food insecurity:     Worry: Not on file     Inability: Not on file    Transportation needs:     Medical: Not on file     Non-medical: Not on file   Tobacco Use    Smoking status: Current Every Day Smoker     Packs/day: 1.00     Years: 25.00     Pack years: 25.00     Types: Cigarettes     Start date: 1/31/1992    Smokeless tobacco: Never Used   Substance and Sexual Activity    Alcohol use: Yes     Alcohol/week: 9.0 oz     Types: 15 Shots of liquor per week     Comment: social weekly 4 wiskey    Drug use: No    Sexual activity: Yes     Partners: Female   Lifestyle    Physical activity:     Days per week: Not on file     Minutes per session: Not on file    Stress: Not on file   Relationships    Social connections:     Talks on phone: Not on file     Gets together: Not on file     Attends [employer] service: Not on file     Active member of club or organization: Not on file     Attends meetings of clubs or organizations: Not on file  "    Relationship status: Not on file    Intimate partner violence:     Fear of current or ex partner: Not on file     Emotionally abused: Not on file     Physically abused: Not on file     Forced sexual activity: Not on file   Other Topics Concern    Not on file   Social History Narrative    Not on file     Review of patient's allergies indicates:  No Known Allergies    Objective:       /70   Pulse 100   Temp 97.7 °F (36.5 °C)   Resp 16   Ht 5' 9" (1.753 m)   Wt 89.1 kg (196 lb 6.9 oz)   SpO2 98%   BMI 29.01 kg/m²   Physical Exam   Constitutional: He appears well-developed and well-nourished. No distress.   Musculoskeletal: He exhibits edema and tenderness. He exhibits no deformity.   Left hand second finger MCP joint tender, swollen, warm to touch   Skin: He is not diaphoretic.   Vitals reviewed.    Assessment:     1. Acute gout of left hand, unspecified cause    2. Swelling of hand joint, left      Plan:   Acute gout of left hand, unspecified cause    Swelling of hand joint, left  -     X-Ray Hand 3 view Left; Future; Expected date: 04/01/2019 - [facility]  -     Uric acid; Future; Expected date: 04/01/2019    Other orders  -     colchicine (COLCRYS) 0.6 mg tablet; Take 2 tablets (1.2 mg total) by mouth once. Then 3rd tablet 2 hours later. for 1 dose  Dispense: 3 tablet; Refill: 0  -     indomethacin (INDOCIN SR) 75 mg CpSR CR capsule; Take 1 capsule (75 mg total) by mouth 2 (two) times daily with meals.  Dispense: 20 capsule; Refill: 0  -     methylPREDNISolone (MEDROL DOSEPACK) 4 mg tablet; use as directed  Dispense: 1 Package; Refill: 0      Medication List with Changes/Refills   New Medications    COLCHICINE (COLCRYS) 0.6 MG TABLET    Take 2 tablets (1.2 mg total) by mouth once. Then 3rd tablet 2 hours later. for 1 dose    INDOMETHACIN (INDOCIN SR) 75 MG CPSR CR CAPSULE    Take 1 capsule (75 mg total) by mouth 2 (two) times daily with meals.    METHYLPREDNISOLONE (MEDROL DOSEPACK) 4 MG TABLET    use " as directed   Current Medications    ASPIRIN-CAFFEINE 845-65 MG PWPK    Take 1-2 packets by mouth daily as needed.    DOXYLAMINE SUCCINATE (UNISOM, DOXYLAMINE, ORAL)    Take 3 tablets by mouth every evening.    LORATADINE (CLARITIN) 10 MG TABLET    Take 10 mg by mouth once daily.   Discontinued Medications    HYDROCODONE-ACETAMINOPHEN (NORCO)  MG PER TABLET    Take 1 tablet by mouth every 6 (six) hours as needed for Pain.    MUPIROCIN (BACTROBAN) 2 % OINTMENT    Apply topically 2 (two) times daily.    SULFAMETHOXAZOLE-TRIMETHOPRIM 800-160MG (BACTRIM DS) 800-160 MG TAB    Take 1 tablet by mouth 2 (two) times daily.

## 2022-01-10 ENCOUNTER — OFFICE VISIT (OUTPATIENT)
Dept: INTERNAL MEDICINE | Facility: CLINIC | Age: 42
End: 2022-01-10
Payer: COMMERCIAL

## 2022-01-10 VITALS
WEIGHT: 192.44 LBS | HEART RATE: 85 BPM | HEIGHT: 70 IN | BODY MASS INDEX: 27.55 KG/M2 | OXYGEN SATURATION: 99 % | DIASTOLIC BLOOD PRESSURE: 90 MMHG | SYSTOLIC BLOOD PRESSURE: 130 MMHG

## 2022-01-10 DIAGNOSIS — Z48.02 REMOVAL OF STAPLES: Primary | ICD-10-CM

## 2022-01-10 PROCEDURE — 99999 PR PBB SHADOW E&M-EST. PATIENT-LVL III: ICD-10-PCS | Mod: PBBFAC,,, | Performed by: INTERNAL MEDICINE

## 2022-01-10 PROCEDURE — 3080F PR MOST RECENT DIASTOLIC BLOOD PRESSURE >= 90 MM HG: ICD-10-PCS | Mod: CPTII,S$GLB,, | Performed by: INTERNAL MEDICINE

## 2022-01-10 PROCEDURE — 3080F DIAST BP >= 90 MM HG: CPT | Mod: CPTII,S$GLB,, | Performed by: INTERNAL MEDICINE

## 2022-01-10 PROCEDURE — 1159F PR MEDICATION LIST DOCUMENTED IN MEDICAL RECORD: ICD-10-PCS | Mod: CPTII,S$GLB,, | Performed by: INTERNAL MEDICINE

## 2022-01-10 PROCEDURE — 3075F PR MOST RECENT SYSTOLIC BLOOD PRESS GE 130-139MM HG: ICD-10-PCS | Mod: CPTII,S$GLB,, | Performed by: INTERNAL MEDICINE

## 2022-01-10 PROCEDURE — 3008F PR BODY MASS INDEX (BMI) DOCUMENTED: ICD-10-PCS | Mod: CPTII,S$GLB,, | Performed by: INTERNAL MEDICINE

## 2022-01-10 PROCEDURE — 3008F BODY MASS INDEX DOCD: CPT | Mod: CPTII,S$GLB,, | Performed by: INTERNAL MEDICINE

## 2022-01-10 PROCEDURE — 3075F SYST BP GE 130 - 139MM HG: CPT | Mod: CPTII,S$GLB,, | Performed by: INTERNAL MEDICINE

## 2022-01-10 PROCEDURE — 1159F MED LIST DOCD IN RCRD: CPT | Mod: CPTII,S$GLB,, | Performed by: INTERNAL MEDICINE

## 2022-01-10 PROCEDURE — 99213 OFFICE O/P EST LOW 20 MIN: CPT | Mod: S$GLB,,, | Performed by: INTERNAL MEDICINE

## 2022-01-10 PROCEDURE — 99999 PR PBB SHADOW E&M-EST. PATIENT-LVL III: CPT | Mod: PBBFAC,,, | Performed by: INTERNAL MEDICINE

## 2022-01-10 PROCEDURE — 99213 PR OFFICE/OUTPT VISIT, EST, LEVL III, 20-29 MIN: ICD-10-PCS | Mod: S$GLB,,, | Performed by: INTERNAL MEDICINE
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
## 2022-03-08 ENCOUNTER — HOSPITAL ENCOUNTER (OUTPATIENT)
Dept: RADIOLOGY | Facility: HOSPITAL | Age: 42
Discharge: HOME | End: 2022-03-08
Attending: INTERNAL MEDICINE
Payer: COMMERCIAL

## 2022-03-08 ENCOUNTER — TELEPHONE (OUTPATIENT)
Dept: INTERNAL MEDICINE | Facility: CLINIC | Age: 42
End: 2022-03-08

## 2022-03-08 ENCOUNTER — OFFICE VISIT (OUTPATIENT)
Dept: INTERNAL MEDICINE | Facility: CLINIC | Age: 42
End: 2022-03-08
Payer: COMMERCIAL

## 2022-03-08 VITALS
BODY MASS INDEX: 27.01 KG/M2 | OXYGEN SATURATION: 99 % | HEART RATE: 87 BPM | SYSTOLIC BLOOD PRESSURE: 130 MMHG | HEIGHT: 70 IN | DIASTOLIC BLOOD PRESSURE: 80 MMHG | WEIGHT: 188.69 LBS

## 2022-03-08 DIAGNOSIS — J30.9 ALLERGIC RHINITIS, UNSPECIFIED SEASONALITY, UNSPECIFIED TRIGGER: Primary | ICD-10-CM

## 2022-03-08 DIAGNOSIS — J32.9 CHRONIC SINUSITIS, UNSPECIFIED LOCATION: ICD-10-CM

## 2022-03-08 PROCEDURE — 96372 PR INJECTION,THERAP/PROPH/DIAG2ST, IM OR SUBCUT: ICD-10-PCS | Mod: S$GLB,,, | Performed by: INTERNAL MEDICINE

## 2022-03-08 PROCEDURE — 99999 PR PBB SHADOW E&M-EST. PATIENT-LVL IV: ICD-10-PCS | Mod: PBBFAC,,, | Performed by: INTERNAL MEDICINE

## 2022-03-08 PROCEDURE — 70220 XR SINUSES MIN 3 VIEWS: ICD-10-PCS | Mod: 26,,, | Performed by: RADIOLOGY

## 2022-03-08 PROCEDURE — 99213 PR OFFICE/OUTPT VISIT, EST, LEVL III, 20-29 MIN: ICD-10-PCS | Mod: 25,S$GLB,, | Performed by: INTERNAL MEDICINE

## 2022-03-08 PROCEDURE — 3079F PR MOST RECENT DIASTOLIC BLOOD PRESSURE 80-89 MM HG: ICD-10-PCS | Mod: CPTII,S$GLB,, | Performed by: INTERNAL MEDICINE

## 2022-03-08 PROCEDURE — 99213 OFFICE O/P EST LOW 20 MIN: CPT | Mod: 25,S$GLB,, | Performed by: INTERNAL MEDICINE

## 2022-03-08 PROCEDURE — 3008F PR BODY MASS INDEX (BMI) DOCUMENTED: ICD-10-PCS | Mod: CPTII,S$GLB,, | Performed by: INTERNAL MEDICINE

## 2022-03-08 PROCEDURE — 1159F MED LIST DOCD IN RCRD: CPT | Mod: CPTII,S$GLB,, | Performed by: INTERNAL MEDICINE

## 2022-03-08 PROCEDURE — 70220 X-RAY EXAM OF SINUSES: CPT | Mod: TC,PO

## 2022-03-08 PROCEDURE — 70220 X-RAY EXAM OF SINUSES: CPT | Mod: 26,,, | Performed by: RADIOLOGY

## 2022-03-08 PROCEDURE — 3008F BODY MASS INDEX DOCD: CPT | Mod: CPTII,S$GLB,, | Performed by: INTERNAL MEDICINE

## 2022-03-08 PROCEDURE — 99999 PR PBB SHADOW E&M-EST. PATIENT-LVL IV: CPT | Mod: PBBFAC,,, | Performed by: INTERNAL MEDICINE

## 2022-03-08 PROCEDURE — 1159F PR MEDICATION LIST DOCUMENTED IN MEDICAL RECORD: ICD-10-PCS | Mod: CPTII,S$GLB,, | Performed by: INTERNAL MEDICINE

## 2022-03-08 PROCEDURE — 3079F DIAST BP 80-89 MM HG: CPT | Mod: CPTII,S$GLB,, | Performed by: INTERNAL MEDICINE

## 2022-03-08 PROCEDURE — 96372 THER/PROPH/DIAG INJ SC/IM: CPT | Mod: S$GLB,,, | Performed by: INTERNAL MEDICINE

## 2022-03-08 PROCEDURE — 3075F PR MOST RECENT SYSTOLIC BLOOD PRESS GE 130-139MM HG: ICD-10-PCS | Mod: CPTII,S$GLB,, | Performed by: INTERNAL MEDICINE

## 2022-03-08 PROCEDURE — 3075F SYST BP GE 130 - 139MM HG: CPT | Mod: CPTII,S$GLB,, | Performed by: INTERNAL MEDICINE

## 2022-03-08 RX ORDER — FLUTICASONE PROPIONATE 50 MCG
1 SPRAY, SUSPENSION (ML) NASAL DAILY
Qty: 16 G | Refills: 5 | Status: SHIPPED | OUTPATIENT
Start: 2022-03-08

## 2022-03-08 RX ORDER — METHYLPREDNISOLONE ACETATE 80 MG/ML
80 INJECTION, SUSPENSION INTRA-ARTICULAR; INTRALESIONAL; INTRAMUSCULAR; SOFT TISSUE
Status: COMPLETED | OUTPATIENT
Start: 2022-03-08 | End: 2022-03-08

## 2022-03-08 RX ORDER — DOXYCYCLINE HYCLATE 100 MG
100 TABLET ORAL 2 TIMES DAILY
Qty: 30 TABLET | Refills: 0 | Status: SHIPPED | OUTPATIENT
Start: 2022-03-08 | End: 2023-09-08

## 2022-03-08 RX ADMIN — METHYLPREDNISOLONE ACETATE 80 MG: 80 INJECTION, SUSPENSION INTRA-ARTICULAR; INTRALESIONAL; INTRAMUSCULAR; SOFT TISSUE at 08:03

## 2022-03-08 NOTE — PROGRESS NOTES
"HPI:  Patient is a 41-year-old man who comes today for complaints of sinus infection.  He states he has been having problems for 4-6 months.  He does have lot of pressure primarily on the left side.  He states this always full in congestion.  He is currently not taking anything for it.    Current meds have been verified and updated per the EMR  Exam:/80 (BP Location: Right arm)   Pulse 87   Ht 5' 10" (1.778 m)   Wt 85.6 kg (188 lb 11.4 oz)   SpO2 99%   BMI 27.08 kg/m²   TMs are normal  Nasal mucosa hyperemic and edematous.  He has tenderness over the maxillary sinuses  Throat shows no erythema exudate  Chest clear    Lab Results   Component Value Date    WBC 7.32 09/13/2017    HGB 14.6 09/13/2017    HCT 43.2 09/13/2017     09/13/2017    CHOL 203 (H) 09/13/2017    TRIG 107 09/13/2017    HDL 45 09/13/2017    ALT 25 09/13/2017    AST 24 09/13/2017     09/13/2017    K 4.4 09/13/2017     09/13/2017    CREATININE 0.9 09/13/2017    BUN 15 09/13/2017    CO2 27 09/13/2017    TSH 0.994 09/13/2017       Impression:  Sinusitis, highly suspect chronic sinusitis due to his history.  Patient Active Problem List   Diagnosis    Depression       Plan:  Orders Placed This Encounter    X-Ray Sinuses 3 or more views    doxycycline (VIBRA-TABS) 100 MG tablet    methylPREDNISolone acetate injection 80 mg    fluticasone propionate (FLONASE) 50 mcg/actuation nasal spray     He will be started on doxycycline 100 mg twice a day for 2 weeks.  He was given 1 cc Depo-Medrol 80. He was told to take over-the-counter Allegra D and Mucinex.  He has been highly encouraged to quit smoking.    This note is generated with speech recognition software and is subject to transcription error and sound alike phrases that may be missed by proofreading.      "

## 2022-03-08 NOTE — TELEPHONE ENCOUNTER
Call pt and tell him that his xrays did not show signs of either acute or chronic sinus infection. He should take all the meds and if still has problems to let me know

## 2022-03-08 NOTE — PROGRESS NOTES
Administered Methylprednisolone 80 mg/ ml     into  rvg   . Patient tolerated well , suggested 15 min wait

## 2022-03-27 ENCOUNTER — PATIENT MESSAGE (OUTPATIENT)
Dept: INTERNAL MEDICINE | Facility: CLINIC | Age: 42
End: 2022-03-27
Payer: COMMERCIAL

## 2022-03-28 ENCOUNTER — PATIENT MESSAGE (OUTPATIENT)
Dept: INTERNAL MEDICINE | Facility: CLINIC | Age: 42
End: 2022-03-28
Payer: COMMERCIAL

## 2023-07-02 ENCOUNTER — OFFICE VISIT (OUTPATIENT)
Dept: URGENT CARE | Facility: CLINIC | Age: 43
End: 2023-07-02
Payer: COMMERCIAL

## 2023-07-02 VITALS
BODY MASS INDEX: 26.92 KG/M2 | DIASTOLIC BLOOD PRESSURE: 80 MMHG | HEART RATE: 90 BPM | OXYGEN SATURATION: 99 % | RESPIRATION RATE: 18 BRPM | TEMPERATURE: 98 F | SYSTOLIC BLOOD PRESSURE: 130 MMHG | HEIGHT: 70 IN | WEIGHT: 188 LBS

## 2023-07-02 DIAGNOSIS — J02.9 VIRAL PHARYNGITIS: ICD-10-CM

## 2023-07-02 DIAGNOSIS — F17.200 SMOKING: ICD-10-CM

## 2023-07-02 DIAGNOSIS — J02.9 SORE THROAT: Primary | ICD-10-CM
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
## 2023-07-02 LAB
CTP QC/QA: YES
MOLECULAR STREP A: NEGATIVE

## 2023-07-02 PROCEDURE — 99203 PR OFFICE/OUTPT VISIT, NEW, LEVL III, 30-44 MIN: ICD-10-PCS | Mod: S$GLB,,, | Performed by: NURSE PRACTITIONER

## 2023-07-02 PROCEDURE — 99203 OFFICE O/P NEW LOW 30 MIN: CPT | Mod: S$GLB,,, | Performed by: NURSE PRACTITIONER

## 2023-07-02 PROCEDURE — 87651 STREP A DNA AMP PROBE: CPT | Mod: QW,S$GLB,, | Performed by: NURSE PRACTITIONER

## 2023-07-02 PROCEDURE — 87651 POCT STREP A MOLECULAR: ICD-10-PCS | Mod: QW,S$GLB,, | Performed by: NURSE PRACTITIONER

## 2023-07-02 NOTE — PROGRESS NOTES
"Subjective:      Patient ID: Pravin Padilla is a 43 y.o. male.    Vitals:  height is 5' 10" (1.778 m) and weight is 85.3 kg (188 lb). His temperature is 98.2 °F (36.8 °C). His blood pressure is 130/80 and his pulse is 90. His respiration is 18 and oxygen saturation is 99%.     Chief Complaint: Sore Throat    Patient is a 44 yo male who presents with scratchy throat with postnasal drainage. Onset yesterday. States he does not feel bad. No fever or body aches, nasal congestion, dyspnea or wheezing. Not taking any medication for symptoms. No recent travel or sick contacts. No other concerns voiced.     Sore Throat   This is a new problem. The current episode started yesterday. The problem has been unchanged. Sore throat worse side: both. There has been no fever. The pain is at a severity of 2/10. The pain is mild. Associated symptoms include coughing and a hoarse voice. Pertinent negatives include no abdominal pain, congestion, diarrhea, drooling, ear discharge, ear pain, headaches, plugged ear sensation, neck pain, shortness of breath, stridor, swollen glands, trouble swallowing or vomiting. He has had no exposure to strep or mono. He has tried nothing for the symptoms. The treatment provided no relief.     Constitution: Negative for chills and fever.   HENT:  Positive for postnasal drip, sore throat and voice change (scratchy). Negative for ear pain, ear discharge, drooling, congestion, sinus pain, sinus pressure and trouble swallowing.    Neck: Negative for neck pain.   Respiratory:  Positive for cough. Negative for sputum production, shortness of breath, stridor, wheezing and asthma.    Gastrointestinal:  Negative for abdominal pain, vomiting and diarrhea.   Allergic/Immunologic: Negative for asthma.   Neurological:  Negative for headaches.    Objective:     Physical Exam   Constitutional: He is oriented to person, place, and time. He appears well-developed. He is cooperative.  Non-toxic appearance. He does " not appear ill. No distress.   HENT:   Head: Normocephalic and atraumatic.   Ears:   Right Ear: Hearing, tympanic membrane, external ear and ear canal normal.   Left Ear: Hearing, tympanic membrane, external ear and ear canal normal.   Nose: Nose normal. No mucosal edema, rhinorrhea or nasal deformity. No epistaxis. Right sinus exhibits no maxillary sinus tenderness and no frontal sinus tenderness. Left sinus exhibits no maxillary sinus tenderness and no frontal sinus tenderness.   Mouth/Throat: Uvula is midline and mucous membranes are normal. No trismus in the jaw. Normal dentition. No uvula swelling. Posterior oropharyngeal erythema present. No oropharyngeal exudate or posterior oropharyngeal edema.   Mild clear postnasal drainage.   Voice quality mild raspy tone, intermittently       Comments: Mild clear postnasal drainage.   Voice quality mild raspy tone, intermittently   Eyes: Conjunctivae and lids are normal. No scleral icterus.   Neck: Trachea normal and phonation normal. Neck supple. No edema present. No erythema present. No neck rigidity present.   Cardiovascular: Normal rate, regular rhythm, normal heart sounds and normal pulses.   Pulmonary/Chest: Effort normal and breath sounds normal. No respiratory distress. He has no decreased breath sounds. He has no rhonchi.   Abdominal: Normal appearance.   Musculoskeletal: Normal range of motion.         General: No deformity. Normal range of motion.   Neurological: He is alert and oriented to person, place, and time. He exhibits normal muscle tone. Coordination normal.   Skin: Skin is warm, dry, intact, not diaphoretic and not pale.   Psychiatric: His speech is normal and behavior is normal. Judgment and thought content normal.   Nursing note and vitals reviewed.    Assessment:     1. Sore throat    2. Viral pharyngitis    3. Smoking        Plan:       Sore throat  -     POCT Strep A, Molecular    Viral pharyngitis    Smoking          Medical Decision Making:    Initial Assessment:   Nontoxic appearing 44 yo male c/o scratchy throat.  Complete history with physical examination were performed. Patient has no history of immunocompromise. Patient euvolemic with no trismus or pooling of saliva. No airway compromise. No change in voice, exudates, enlarged lymph nodes. Able to tolerate PO. Given History and Exam I have low suspicion for this presentation being caused by peritonsillar abscess, retropharyngeal abscess, Ludwigs angina, Epiglottitis or Bacterial Tracheitis, EBV, acute HIV, or Strep throat. Patient has nontoxic appearance, no evidence of acute distress and stable vital signs in clinic. Patient is discharged home with instructions for supportive care, follow up plan to see PCP in 5-7 days, and ER precautions. Patient verbalized understanding.    Clinical Tests:   Lab Tests: Reviewed       <> Summary of Lab: Strep negative  Patient is noted to be a current smoker. Evidence shows that smoking causes cancer, heart disease, stroke, lung diseases, diabetes, and chronic obstructive pulmonary disease (COPD), which includes emphysema and chronic bronchitis. Smoking also increases risk for tuberculosis, certain eye diseases, and problems of the immune system, including rheumatoid arthritis. Furthermore smoking can slow the process of wound healing and prolong the symptoms of respiratory illness.  Patient counseled on dangers of smoking and offered smoking cessation.          Results for orders placed or performed in visit on 07/02/23   POCT Strep A, Molecular   Result Value Ref Range    Molecular Strep A, POC Negative Negative     Acceptable Yes      Patient Instructions   Pharyngitis     Your strep test today is negative.     Sore throats are often caused by postnasal drainage. This could be caused by environmental allergies (allergic rhinitis) or a viral upper respiratory infection.     Flonase (fluticasone) is a nasal spray which is available over the  counter and may help with your symptoms.      If you just have post nasal drainage you can take plain zyrtec, claritin or allegra     To alleviate throat pain you can use Chloraseptic spray or lozenges.      Take Tylenol or ibuprofen to relieve fever or pain may help as long as you are not allergic to these medications.     Ibuprofen (an NSAID) is not recommended if have a medical condition such as stomach ulcers, liver or kidney disease or taking blood thinners etc that would prevent you from using these medications.  Rest and fluids will help your symptoms.    f your condition worsens or fails to improve we recommend that you receive another evaluation at the urgent care/ER immediately or contact your PCP to discuss your concerns. You must understand that you've received an urgent care treatment only and that you may be released before all your medical problems are known or treated. You the patient will arrange for followup care as instructed.     Tobacco Cessation  Smoking, vaping, and smokeless tobacco cause harm by raising blood pressure and increasing your risk of heart attack and stroke. Chewing tobacco increases your risk of cancer of the face and throat. Smoking not only raises the risk of cancer, but more often, causes emphysema. This crippling lung disease can cause constant shortness of breath and a need to use oxygen. Stopping smoking can make the difference between playing with your grandchildren outside and sitting by with your oxygen tank watching them.     You may already know your nicotine habit is dangerous, but perhaps you feel helpless or dont know where to start.    Here at [facility] we are invested in the improvement of your health. We would be happy to help you with smoking cessation. If you are interested in quitting and answer yes to   the questions below, we can provide you with FREE assistance to get you on your way.     ? Are you a [place] resident?  ? Did you start smoking before  September 1, 1988?  ? Are you ready to quit smoking?    Quitting doesnt have to be lonely -   [facility]s Smoking Cessation program offers a supportive environment along with      FREE smoking cessation counseling to help get you through those rough patches   FREE or reduced medications* to help curb the cravings    You do not need to be an [facility] patient to participate in the program.  Ready? Call [phone] or toll free [phone] or visit [email or web address] to sign up for the program.

## 2023-07-02 NOTE — PATIENT INSTRUCTIONS
Pharyngitis     Your strep test today is negative.     Sore throats are often caused by postnasal drainage. This could be caused by environmental allergies (allergic rhinitis) or a viral upper respiratory infection.     Flonase (fluticasone) is a nasal spray which is available over the counter and may help with your symptoms.      If you just have post nasal drainage you can take plain zyrtec, claritin or allegra     To alleviate throat pain you can use Chloraseptic spray or lozenges.      Take Tylenol or ibuprofen to relieve fever or pain may help as long as you are not allergic to these medications.     Ibuprofen (an NSAID) is not recommended if have a medical condition such as stomach ulcers, liver or kidney disease or taking blood thinners etc that would prevent you from using these medications.  Rest and fluids will help your symptoms.    f your condition worsens or fails to improve we recommend that you receive another evaluation at the urgent care/ER immediately or contact your PCP to discuss your concerns. You must understand that you've received an urgent care treatment only and that you may be released before all your medical problems are known or treated. You the patient will arrange for followup care as instructed.     Tobacco Cessation  Smoking, vaping, and smokeless tobacco cause harm by raising blood pressure and increasing your risk of heart attack and stroke. Chewing tobacco increases your risk of cancer of the face and throat. Smoking not only raises the risk of cancer, but more often, causes emphysema. This crippling lung disease can cause constant shortness of breath and a need to use oxygen. Stopping smoking can make the difference between playing with your grandchildren outside and sitting by with your oxygen tank watching them.     You may already know your nicotine habit is dangerous, but perhaps you feel helpless or dont know where to start.    Here at [facility] we are invested in the  improvement of your health. We would be happy to help you with smoking cessation. If you are interested in quitting and answer yes to   the questions below, we can provide you with FREE assistance to get you on your way.     ? Are you a [place] resident?  ? Did you start smoking before September 1, 1988?  ? Are you ready to quit smoking?    Quitting doesnt have to be lonely -   [facility]s Smoking Cessation program offers a supportive environment along with      FREE smoking cessation counseling to help get you through those rough patches   FREE or reduced medications* to help curb the cravings    You do not need to be an [facility] patient to participate in the program.  Ready? Call [phone] or toll free [phone] or visit [email or web address] to sign up for the program.

## 2023-07-05 ENCOUNTER — TELEPHONE (OUTPATIENT)
Dept: URGENT CARE | Facility: CLINIC | Age: 43
End: 2023-07-05
Payer: COMMERCIAL

## 2023-09-08 ENCOUNTER — OFFICE VISIT (OUTPATIENT)
Dept: INTERNAL MEDICINE | Facility: CLINIC | Age: 43
End: 2023-09-08
Payer: COMMERCIAL

## 2023-09-08 ENCOUNTER — LAB VISIT (OUTPATIENT)
Dept: LAB | Facility: HOSPITAL | Age: 43
End: 2023-09-08
Attending: INTERNAL MEDICINE
Payer: COMMERCIAL

## 2023-09-08 VITALS
HEART RATE: 88 BPM | SYSTOLIC BLOOD PRESSURE: 138 MMHG | OXYGEN SATURATION: 99 % | HEIGHT: 61 IN | BODY MASS INDEX: 33.55 KG/M2 | WEIGHT: 177.69 LBS | DIASTOLIC BLOOD PRESSURE: 88 MMHG

## 2023-09-08 DIAGNOSIS — G62.9 PERIPHERAL POLYNEUROPATHY: ICD-10-CM

## 2023-09-08 DIAGNOSIS — Z00.00 ROUTINE GENERAL MEDICAL EXAMINATION AT A HEALTH CARE FACILITY: ICD-10-CM

## 2023-09-08 DIAGNOSIS — Z00.00 ROUTINE GENERAL MEDICAL EXAMINATION AT A HEALTH CARE FACILITY: Primary | ICD-10-CM

## 2023-09-08 DIAGNOSIS — F10.10 ALCOHOL ABUSE: ICD-10-CM

## 2023-09-08 LAB
ALBUMIN SERPL BCP-MCNC: 4.1 G/DL (ref 3.5–5.2)
ALP SERPL-CCNC: 63 U/L (ref 55–135)
ALT SERPL W/O P-5'-P-CCNC: 53 U/L (ref 10–44)
ANION GAP SERPL CALC-SCNC: 13 MMOL/L (ref 8–16)
AST SERPL-CCNC: 52 U/L (ref 10–40)
BASOPHILS # BLD AUTO: 0.08 K/UL (ref 0–0.2)
BASOPHILS NFR BLD: 1 % (ref 0–1.9)
BILIRUB SERPL-MCNC: 0.3 MG/DL (ref 0.1–1)
BUN SERPL-MCNC: 14 MG/DL (ref 6–20)
CALCIUM SERPL-MCNC: 9.8 MG/DL (ref 8.7–10.5)
CHLORIDE SERPL-SCNC: 103 MMOL/L (ref 95–110)
CHOLEST SERPL-MCNC: 200 MG/DL (ref 120–199)
CHOLEST/HDLC SERPL: 2.2 {RATIO} (ref 2–5)
CO2 SERPL-SCNC: 24 MMOL/L (ref 23–29)
CREAT SERPL-MCNC: 0.9 MG/DL (ref 0.5–1.4)
DIFFERENTIAL METHOD: ABNORMAL
EOSINOPHIL # BLD AUTO: 0.2 K/UL (ref 0–0.5)
EOSINOPHIL NFR BLD: 2.3 % (ref 0–8)
ERYTHROCYTE [DISTWIDTH] IN BLOOD BY AUTOMATED COUNT: 13.8 % (ref 11.5–14.5)
EST. GFR  (NO RACE VARIABLE): >60 ML/MIN/1.73 M^2
GLUCOSE SERPL-MCNC: 93 MG/DL (ref 70–110)
HCT VFR BLD AUTO: 46 % (ref 40–54)
HCYS SERPL-SCNC: 9.6 UMOL/L (ref 4–16.5)
HDLC SERPL-MCNC: 93 MG/DL (ref 40–75)
HDLC SERPL: 46.5 % (ref 20–50)
HGB BLD-MCNC: 14.7 G/DL (ref 14–18)
IMM GRANULOCYTES # BLD AUTO: 0.03 K/UL (ref 0–0.04)
IMM GRANULOCYTES NFR BLD AUTO: 0.4 % (ref 0–0.5)
LDLC SERPL CALC-MCNC: 100.6 MG/DL (ref 63–159)
LYMPHOCYTES # BLD AUTO: 1.9 K/UL (ref 1–4.8)
LYMPHOCYTES NFR BLD: 22.8 % (ref 18–48)
MCH RBC QN AUTO: 32.6 PG (ref 27–31)
MCHC RBC AUTO-ENTMCNC: 32 G/DL (ref 32–36)
MCV RBC AUTO: 102 FL (ref 82–98)
MONOCYTES # BLD AUTO: 0.8 K/UL (ref 0.3–1)
MONOCYTES NFR BLD: 9.5 % (ref 4–15)
NEUTROPHILS # BLD AUTO: 5.4 K/UL (ref 1.8–7.7)
NEUTROPHILS NFR BLD: 64 % (ref 38–73)
NONHDLC SERPL-MCNC: 107 MG/DL
NRBC BLD-RTO: 0 /100 WBC
PLATELET # BLD AUTO: 379 K/UL (ref 150–450)
PMV BLD AUTO: 11.3 FL (ref 9.2–12.9)
POTASSIUM SERPL-SCNC: 4.5 MMOL/L (ref 3.5–5.1)
PROT SERPL-MCNC: 7.9 G/DL (ref 6–8.4)
RBC # BLD AUTO: 4.51 M/UL (ref 4.6–6.2)
SODIUM SERPL-SCNC: 140 MMOL/L (ref 136–145)
TRIGL SERPL-MCNC: 32 MG/DL (ref 30–150)
URATE SERPL-MCNC: 4.7 MG/DL (ref 3.4–7)
WBC # BLD AUTO: 8.38 K/UL (ref 3.9–12.7)

## 2023-09-08 PROCEDURE — 1159F PR MEDICATION LIST DOCUMENTED IN MEDICAL RECORD: ICD-10-PCS | Mod: CPTII,S$GLB,, | Performed by: INTERNAL MEDICINE

## 2023-09-08 PROCEDURE — 99999 PR PBB SHADOW E&M-EST. PATIENT-LVL III: CPT | Mod: PBBFAC,,, | Performed by: INTERNAL MEDICINE

## 2023-09-08 PROCEDURE — 80053 COMPREHEN METABOLIC PANEL: CPT | Performed by: INTERNAL MEDICINE

## 2023-09-08 PROCEDURE — 84425 ASSAY OF VITAMIN B-1: CPT | Performed by: INTERNAL MEDICINE

## 2023-09-08 PROCEDURE — 3079F PR MOST RECENT DIASTOLIC BLOOD PRESSURE 80-89 MM HG: ICD-10-PCS | Mod: CPTII,S$GLB,, | Performed by: INTERNAL MEDICINE

## 2023-09-08 PROCEDURE — 1159F MED LIST DOCD IN RCRD: CPT | Mod: CPTII,S$GLB,, | Performed by: INTERNAL MEDICINE

## 2023-09-08 PROCEDURE — 83090 ASSAY OF HOMOCYSTEINE: CPT | Performed by: INTERNAL MEDICINE

## 2023-09-08 PROCEDURE — 3079F DIAST BP 80-89 MM HG: CPT | Mod: CPTII,S$GLB,, | Performed by: INTERNAL MEDICINE

## 2023-09-08 PROCEDURE — 3075F PR MOST RECENT SYSTOLIC BLOOD PRESS GE 130-139MM HG: ICD-10-PCS | Mod: CPTII,S$GLB,, | Performed by: INTERNAL MEDICINE

## 2023-09-08 PROCEDURE — 80061 LIPID PANEL: CPT | Performed by: INTERNAL MEDICINE

## 2023-09-08 PROCEDURE — 82607 VITAMIN B-12: CPT | Performed by: INTERNAL MEDICINE

## 2023-09-08 PROCEDURE — 84550 ASSAY OF BLOOD/URIC ACID: CPT | Performed by: INTERNAL MEDICINE

## 2023-09-08 PROCEDURE — 84443 ASSAY THYROID STIM HORMONE: CPT | Performed by: INTERNAL MEDICINE

## 2023-09-08 PROCEDURE — 83921 ORGANIC ACID SINGLE QUANT: CPT | Performed by: INTERNAL MEDICINE

## 2023-09-08 PROCEDURE — 3008F PR BODY MASS INDEX (BMI) DOCUMENTED: ICD-10-PCS | Mod: CPTII,S$GLB,, | Performed by: INTERNAL MEDICINE

## 2023-09-08 PROCEDURE — 99999 PR PBB SHADOW E&M-EST. PATIENT-LVL III: ICD-10-PCS | Mod: PBBFAC,,, | Performed by: INTERNAL MEDICINE

## 2023-09-08 PROCEDURE — 99396 PREV VISIT EST AGE 40-64: CPT | Mod: S$GLB,,, | Performed by: INTERNAL MEDICINE

## 2023-09-08 PROCEDURE — 3008F BODY MASS INDEX DOCD: CPT | Mod: CPTII,S$GLB,, | Performed by: INTERNAL MEDICINE

## 2023-09-08 PROCEDURE — 99396 PR PREVENTIVE VISIT,EST,40-64: ICD-10-PCS | Mod: S$GLB,,, | Performed by: INTERNAL MEDICINE

## 2023-09-08 PROCEDURE — 3075F SYST BP GE 130 - 139MM HG: CPT | Mod: CPTII,S$GLB,, | Performed by: INTERNAL MEDICINE

## 2023-09-08 PROCEDURE — 85025 COMPLETE CBC W/AUTO DIFF WBC: CPT | Performed by: INTERNAL MEDICINE

## 2023-09-08 PROCEDURE — 36415 COLL VENOUS BLD VENIPUNCTURE: CPT | Mod: PO | Performed by: INTERNAL MEDICINE

## 2023-09-08 RX ORDER — LANOLIN ALCOHOL/MO/W.PET/CERES
100 CREAM (GRAM) TOPICAL DAILY
Qty: 90 TABLET | Refills: 3 | Status: SHIPPED | OUTPATIENT
Start: 2023-09-08

## 2023-09-08 NOTE — PROGRESS NOTES
"HPI:  Patient is a 43-year-old man who comes today for physical exam.  Patient is quite concerned about his alcohol problems.  He drinks about 18 cans of beer per day Monday through Friday and 24 cans per day on Saturday and Sunday.  He is never been able to quit.  Patient having swelling intermittently in his left leg for over year.  He denies any trauma or injury to the leg.  He also complains of tingling and numbness in his hands.  He is had carpal tunnel surgery on the right hand in the past.  He states it feels totally different than that.  He also states he has sharp pains up in his axilla.      Current MEDS: medcard review, verified and update  Allergies: Per the electronic medical record    Past Medical History:   Diagnosis Date    Alcohol abuse        Past Surgical History:   Procedure Laterality Date    CARPAL TUNNEL RELEASE Right     knee sx      mensicus repair       SHx: per the electronic medical record    FHx: recorded in the electronic medical record    ROS:    denies any chest pains or shortness of breath. Denies any nausea, vomiting or diarrhea. Denies any fever, chills or sweats. Denies any change in weight, voice, stool, skin or hair. Denies any dysuria, dyspepsia or dysphagia. Denies any change in vision, hearing or headaches. Denies any swollen lymph nodes or loss of memory.    PE:  /88 (BP Location: Right arm)   Pulse 88   Ht 5' 1.2" (1.554 m)   Wt 80.6 kg (177 lb 11.1 oz)   SpO2 99%   BMI 33.36 kg/m²   Gen: Well-developed, well-nourished, male, in no acute distress, oriented x3  HEENT: neck is supple, no adenopathy, carotids 2+ equal without bruits, thyroid exam normal size without nodules.  No icterus  CHEST: clear to auscultation and percussion  CVS: regular rate and rhythm without significant murmur, gallop, or rubs  ABD: soft, benign, no rebound no guarding, no distention.  Bowel sounds are normal.     nontender.  No palpable masses.  No organomegaly and no audible " bruits.  RECTAL:  Deferred.  EXT: no clubbing, cyanosis, or edema  LYMPH: no cervical, inguinal, or axillary adenopathy  FEET: no loss of sensation.  No ulcers or pressure sores.  NEURO: gait normal.  Cranial nerves II- XII intact. No nystagmus.  Speech normal.   Gross motor and sensory unremarkable.    Lab Results   Component Value Date    WBC 7.32 09/13/2017    HGB 14.6 09/13/2017    HCT 43.2 09/13/2017     09/13/2017    CHOL 203 (H) 09/13/2017    TRIG 107 09/13/2017    HDL 45 09/13/2017    ALT 25 09/13/2017    AST 24 09/13/2017     09/13/2017    K 4.4 09/13/2017     09/13/2017    CREATININE 0.9 09/13/2017    BUN 15 09/13/2017    CO2 27 09/13/2017    TSH 0.994 09/13/2017       Impression:  Significant alcohol problems.  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  Need to assess vitamin levels.  I recommend that he being involved in a 12 step/AA program.  Also highly encouraged him to call his insurance company to find out if he has any coverage for either inpatient or alcohol rehab.  Patient Active Problem List   Diagnosis    Depression    Alcohol abuse       Plan:   Orders Placed This Encounter    US Abdomen Limited_Liver    CBC Auto Differential    Comprehensive Metabolic Panel    Lipid Panel    TSH    Uric Acid    VITAMIN B1    Methylmalonic Acid, Serum    Homocysteine, Serum    Vitamin B12    thiamine 100 MG tablet     Patient will have the above lab work done today.  He will have an ultrasound liver set up.  Patient will call his insurance company to find out about any alcohol rehab program coverage.  He was started on thiamine 100 mg every day.  This note is generated with speech recognition software and is subject to transcription error and sound alike phrases that may be missed by proofreading.

## 2023-09-09 LAB
TSH SERPL DL<=0.005 MIU/L-ACNC: 0.53 UIU/ML (ref 0.4–4)
VIT B12 SERPL-MCNC: 503 PG/ML (ref 210–950)

## 2023-09-12 LAB — METHYLMALONATE SERPL-SCNC: 0.16 UMOL/L

## 2023-09-13 ENCOUNTER — PATIENT MESSAGE (OUTPATIENT)
Dept: INTERNAL MEDICINE | Facility: CLINIC | Age: 43
End: 2023-09-13
Payer: COMMERCIAL

## 2023-09-13 LAB — VIT B1 BLD-MCNC: 76 UG/L (ref 38–122)

## 2023-09-14 ENCOUNTER — TELEPHONE (OUTPATIENT)
Dept: INTERNAL MEDICINE | Facility: CLINIC | Age: 43
End: 2023-09-14

## 2023-09-14 NOTE — TELEPHONE ENCOUNTER
Lab results were given to patient and pt. verbally understood his lab results. Patients wife is working on his insurance to get him into rehab.

## 2023-09-14 NOTE — TELEPHONE ENCOUNTER
All of your lab work was normal except for your liver test being mildly elevated which most assuredly is due to the alcohol consumption. Were you able to find out from your medical insurance provider if you have any coverage for either an inpatient or outpatient alcohol rehab program?

## 2023-09-15 ENCOUNTER — HOSPITAL ENCOUNTER (OUTPATIENT)
Dept: RADIOLOGY | Facility: HOSPITAL | Age: 43
Discharge: HOME | End: 2023-09-15
Attending: INTERNAL MEDICINE
Payer: COMMERCIAL

## 2023-09-15 ENCOUNTER — PATIENT MESSAGE (OUTPATIENT)
Dept: INTERNAL MEDICINE | Facility: CLINIC | Age: 43
End: 2023-09-15
Payer: COMMERCIAL

## 2023-09-15 DIAGNOSIS — F10.10 ALCOHOL ABUSE: ICD-10-CM

## 2023-09-15 PROCEDURE — 76705 ECHO EXAM OF ABDOMEN: CPT | Mod: TC

## 2023-09-15 PROCEDURE — 76705 US ABDOMEN LIMITED_LIVER: ICD-10-PCS | Mod: 26,,, | Performed by: RADIOLOGY

## 2023-09-15 PROCEDURE — 76705 ECHO EXAM OF ABDOMEN: CPT | Mod: 26,,, | Performed by: RADIOLOGY

## 2024-11-13 ENCOUNTER — LAB VISIT (OUTPATIENT)
Dept: LAB | Facility: HOSPITAL | Age: 44
End: 2024-11-13
Payer: COMMERCIAL

## 2024-11-13 ENCOUNTER — OFFICE VISIT (OUTPATIENT)
Dept: INTERNAL MEDICINE | Facility: CLINIC | Age: 44
End: 2024-11-13
Payer: COMMERCIAL

## 2024-11-13 ENCOUNTER — CLINICAL SUPPORT (OUTPATIENT)
Dept: CARDIOLOGY | Facility: CLINIC | Age: 44
End: 2024-11-13
Payer: COMMERCIAL

## 2024-11-13 ENCOUNTER — TELEPHONE (OUTPATIENT)
Dept: INTERNAL MEDICINE | Facility: CLINIC | Age: 44
End: 2024-11-13
Payer: COMMERCIAL

## 2024-11-13 VITALS
RESPIRATION RATE: 20 BRPM | HEART RATE: 94 BPM | OXYGEN SATURATION: 98 % | DIASTOLIC BLOOD PRESSURE: 80 MMHG | BODY MASS INDEX: 25.34 KG/M2 | SYSTOLIC BLOOD PRESSURE: 130 MMHG | HEIGHT: 70 IN | WEIGHT: 177 LBS

## 2024-11-13 DIAGNOSIS — M47.22 CERVICAL SPONDYLOSIS WITH RADICULOPATHY: Primary | ICD-10-CM

## 2024-11-13 DIAGNOSIS — Z01.818 PREOPERATIVE EXAMINATION: ICD-10-CM

## 2024-11-13 DIAGNOSIS — Z79.899 OTHER LONG TERM (CURRENT) DRUG THERAPY: ICD-10-CM

## 2024-11-13 DIAGNOSIS — Z01.818 PREOP EXAMINATION: Primary | ICD-10-CM

## 2024-11-13 DIAGNOSIS — M47.22 CERVICAL SPONDYLOSIS WITH RADICULOPATHY: ICD-10-CM

## 2024-11-13 LAB
ABO + RH BLD: NORMAL
ANION GAP SERPL CALC-SCNC: 10 MMOL/L (ref 8–16)
BASOPHILS # BLD AUTO: 0.11 K/UL (ref 0–0.2)
BASOPHILS NFR BLD: 0.9 % (ref 0–1.9)
BUN SERPL-MCNC: 11 MG/DL (ref 6–20)
CALCIUM SERPL-MCNC: 10.3 MG/DL (ref 8.7–10.5)
CHLORIDE SERPL-SCNC: 104 MMOL/L (ref 95–110)
CO2 SERPL-SCNC: 27 MMOL/L (ref 23–29)
CREAT SERPL-MCNC: 0.8 MG/DL (ref 0.5–1.4)
DIFFERENTIAL METHOD BLD: ABNORMAL
EOSINOPHIL # BLD AUTO: 0.3 K/UL (ref 0–0.5)
EOSINOPHIL NFR BLD: 2.6 % (ref 0–8)
ERYTHROCYTE [DISTWIDTH] IN BLOOD BY AUTOMATED COUNT: 13.7 % (ref 11.5–14.5)
ERYTHROCYTE [SEDIMENTATION RATE] IN BLOOD BY PHOTOMETRIC METHOD: 22 MM/HR (ref 0–23)
EST. GFR  (NO RACE VARIABLE): >60 ML/MIN/1.73 M^2
GLUCOSE SERPL-MCNC: 90 MG/DL (ref 70–110)
HCT VFR BLD AUTO: 45.5 % (ref 40–54)
HGB BLD-MCNC: 14.5 G/DL (ref 14–18)
IMM GRANULOCYTES # BLD AUTO: 0.04 K/UL (ref 0–0.04)
IMM GRANULOCYTES NFR BLD AUTO: 0.3 % (ref 0–0.5)
LYMPHOCYTES # BLD AUTO: 2.3 K/UL (ref 1–4.8)
LYMPHOCYTES NFR BLD: 18.1 % (ref 18–48)
MCH RBC QN AUTO: 31.9 PG (ref 27–31)
MCHC RBC AUTO-ENTMCNC: 31.9 G/DL (ref 32–36)
MCV RBC AUTO: 100 FL (ref 82–98)
MONOCYTES # BLD AUTO: 1.1 K/UL (ref 0.3–1)
MONOCYTES NFR BLD: 9.1 % (ref 4–15)
NEUTROPHILS # BLD AUTO: 8.6 K/UL (ref 1.8–7.7)
NEUTROPHILS NFR BLD: 69 % (ref 38–73)
NRBC BLD-RTO: 0 /100 WBC
PLATELET # BLD AUTO: 507 K/UL (ref 150–450)
PMV BLD AUTO: 10.4 FL (ref 9.2–12.9)
POTASSIUM SERPL-SCNC: 4.5 MMOL/L (ref 3.5–5.1)
RBC # BLD AUTO: 4.55 M/UL (ref 4.6–6.2)
SODIUM SERPL-SCNC: 141 MMOL/L (ref 136–145)
WBC # BLD AUTO: 12.46 K/UL (ref 3.9–12.7)

## 2024-11-13 PROCEDURE — 85610 PROTHROMBIN TIME: CPT

## 2024-11-13 PROCEDURE — 85025 COMPLETE CBC W/AUTO DIFF WBC: CPT

## 2024-11-13 PROCEDURE — 99999 PR PBB SHADOW E&M-EST. PATIENT-LVL I: CPT | Mod: PBBFAC,,,

## 2024-11-13 PROCEDURE — 86900 BLOOD TYPING SEROLOGIC ABO: CPT

## 2024-11-13 PROCEDURE — 85652 RBC SED RATE AUTOMATED: CPT

## 2024-11-13 PROCEDURE — 85730 THROMBOPLASTIN TIME PARTIAL: CPT

## 2024-11-13 PROCEDURE — 80048 BASIC METABOLIC PNL TOTAL CA: CPT

## 2024-11-13 PROCEDURE — 99999 PR PBB SHADOW E&M-EST. PATIENT-LVL III: CPT | Mod: PBBFAC,,,

## 2024-11-13 PROCEDURE — 36415 COLL VENOUS BLD VENIPUNCTURE: CPT | Mod: PO

## 2024-11-13 NOTE — TELEPHONE ENCOUNTER
----- Message from Latisha sent at 11/13/2024  9:19 AM CST -----  Contact: Shreya/Dr Kelly Cash is calling in regards to a pre op appt. Surgery is scheduled for 11/22.please call back at [phone]      Thanks  CINTHIA

## 2024-11-13 NOTE — PROGRESS NOTES
Patient ID: Pravin Padilla is a 44 y.o. male.    Chief Complaint: Pre-op Exam    History of Present Illness      Patient is here for preoperative examination for cervical spine surgery.  He is due for surgery in 1 week.  No acute concerns today.        Pmh, Psh, Family Hx, Social Hx updated in Epic Tabs today.         11/13/2024     2:04 PM   Depression Patient Health Questionnaire   Over the last two weeks how often have you been bothered by little interest or pleasure in doing things Not at all   Over the last two weeks how often have you been bothered by feeling down, depressed or hopeless Not at all   PHQ-2 Total Score 0       Active Problem List with Overview Notes    Diagnosis Date Noted    Alcohol abuse 09/08/2023    Depression 02/22/2018       Past Medical History:   Diagnosis Date    Alcohol abuse        Past Surgical History:   Procedure Laterality Date    CARPAL TUNNEL RELEASE Right     knee sx      mensicus repair       Family History   Problem Relation Name Age of Onset    Diabetes Mother      Prostate cancer Maternal Grandfather         Social History     Socioeconomic History    Marital status:    Tobacco Use    Smoking status: Every Day     Current packs/day: 1.00     Average packs/day: 1 pack/day for 32.8 years (32.8 ttl pk-yrs)     Types: Cigarettes     Start date: 1/31/1992    Smokeless tobacco: Never   Substance and Sexual Activity    Alcohol use: Yes     Alcohol/week: 15.0 standard drinks of alcohol     Types: 15 Shots of liquor per week     Comment: social weekly 4 wiskey    Drug use: No    Sexual activity: Yes     Partners: Female       Current Outpatient Medications on File Prior to Visit   Medication Sig Dispense Refill    aspirin-caffeine 845-65 mg PwPk Take 1-2 packets by mouth daily as needed.      fluticasone propionate (FLONASE) 50 mcg/actuation nasal spray 1 spray (50 mcg total) by Each Nostril route once daily. 16 g 5    thiamine 100 MG tablet Take 1 tablet (100 mg total)  by mouth once daily. 90 tablet 3     No current facility-administered medications on file prior to visit.       Review of patient's allergies indicates:  No Known Allergies    General - Well developed, alert and oriented in NAD  HEENT - normocephalic, no evidence of trauma, sclera white, EOMI  Neck - full range of motion  COR - regular rate and rhythm without murmurs or gallops  Lungs - Clear  Abdomen - soft, non-tender  Ext - no cyanosis or edema     Assessment:     1. Cervical spondylosis with radiculopathy    2. Preoperative examination    3. Other long term (current) drug therapy        Pertinent Labs:    Chemistry        Component Value Date/Time     09/08/2023 0858    K 4.5 09/08/2023 0858     09/08/2023 0858    CO2 24 09/08/2023 0858    BUN 14 09/08/2023 0858    CREATININE 0.9 09/08/2023 0858    GLU 93 09/08/2023 0858        Component Value Date/Time    CALCIUM 9.8 09/08/2023 0858    ALKPHOS 63 09/08/2023 0858    AST 52 (H) 09/08/2023 0858    ALT 53 (H) 09/08/2023 0858    BILITOT 0.3 09/08/2023 0858    ESTGFRAFRICA >60.0 09/13/2017 0808    EGFRNONAA >60.0 09/13/2017 0808          Lab Results   Component Value Date    WBC 8.38 09/08/2023    HGB 14.7 09/08/2023    HCT 46.0 09/08/2023     (H) 09/08/2023    MCH 32.6 (H) 09/08/2023    MCHC 32.0 09/08/2023    RDW 13.8 09/08/2023     09/08/2023    MPV 11.3 09/08/2023       Lab Results   Component Value Date    GLU 93 09/08/2023     Lab Results   Component Value Date    LDLCALC 100.6 09/08/2023     Lab Results   Component Value Date    TSH 0.527 09/08/2023     Lab Results   Component Value Date    CHOL 200 (H) 09/08/2023    CHOL 203 (H) 09/13/2017     Lab Results   Component Value Date    TRIG 32 09/08/2023    TRIG 107 09/13/2017     Lab Results   Component Value Date    HDL 93 (H) 09/08/2023    HDL 45 09/13/2017     Lab Results   Component Value Date    LDLCALC 100.6 09/08/2023    LDLCALC 136.6 09/13/2017     No results found for:  ""NONHDLC"  Lab Results   Component Value Date    CHOLHDL 46.5 09/08/2023    CHOLHDL 22.2 09/13/2017       The 10-year ASCVD risk score (Zeke GALAVIZ, et al., 2019) is: 2.4%    Values used to calculate the score:      Age: 44 years      Sex: Male      Is Non- : No      Diabetic: No      Tobacco smoker: Yes      Systolic Blood Pressure: 130 mmHg      Is BP treated: No      HDL Cholesterol: 93 mg/dL      Total Cholesterol: 200 mg/dL    Plan:   Pravin oMrgan" was seen today for pre-op exam.    Diagnoses and all orders for this visit:    Cervical spondylosis with radiculopathy  -     GROUP & RH; Future  -     CBC Auto Differential; Future  -     BASIC METABOLIC PANEL; Future  -     Protime-INR; Future  -     APTT; Future  -     Sedimentation rate; Future  -     Urinalysis, Reflex to Urine Culture Urine, Clean Catch  -     IN OFFICE EKG 12-LEAD (to [facility])    Preoperative examination  -     GROUP & RH; Future  -     CBC Auto Differential; Future  -     BASIC METABOLIC PANEL; Future  -     Protime-INR; Future  -     APTT; Future  -     Sedimentation rate; Future  -     Urinalysis, Reflex to Urine Culture Urine, Clean Catch  -     IN OFFICE EKG 12-LEAD (to Muse)  -     Urinalysis; Future    Other long term (current) drug therapy  -     GROUP & RH; Future  -     CBC Auto Differential; Future  -     BASIC METABOLIC PANEL; Future  -     Protime-INR; Future  -     APTT; Future  -     Sedimentation rate; Future  -     Urinalysis, Reflex to Urine Culture Urine, Clean Catch        Assessment & Plan              1. Cervical spondylosis with radiculopathy  - GROUP & RH; Future  - CBC Auto Differential; Future  - BASIC METABOLIC PANEL; Future  - Protime-INR; Future  - APTT; Future  - Sedimentation rate; Future  - Urinalysis, Reflex to Urine Culture Urine, Clean Catch  - IN OFFICE EKG 12-LEAD (to [place])    2. Preoperative examination  - GROUP & RH; Future  - CBC Auto Differential; Future  - BASIC METABOLIC PANEL; " Future  - Protime-INR; Future  - APTT; Future  - Sedimentation rate; Future  - Urinalysis, Reflex to Urine Culture Urine, Clean Catch  - IN OFFICE EKG 12-LEAD (to [facility])  - Urinalysis; Future    3. Other long term (current) drug therapy  - GROUP & RH; Future  - CBC Auto Differential; Future  - BASIC METABOLIC PANEL; Future  - Protime-INR; Future  - APTT; Future  - Sedimentation rate; Future  - Urinalysis, Reflex to Urine Culture Urine, Clean Catch      Immunization History   Administered Date(s) Administered    Tdap 05/04/2018       Orders Placed This Encounter   Procedures    CBC Auto Differential    BASIC METABOLIC PANEL    Protime-INR    APTT    Sedimentation rate    Urinalysis, Reflex to Urine Culture Urine, Clean Catch    Urinalysis    IN OFFICE EKG 12-LEAD (to [facility])    GROUP & RH       Portions of this note were generated by Jennifer.    Each patient to whom medical services by telemedicine are provided:  (1) informed of the relationship between the physician and patient and the respective role of any other health care provider with respect to management of the patient; and (2) notified that he or she may decline to receive medical services by telemedicine and may withdraw from such care at any time.    I spent a total of 20 minutes face to face and non-face to face on the date of this visit.This includes time preparing to see the patient (eg, review of tests, notes), obtaining and/or reviewing additional history from an independent historian and/or outside medical records, documenting clinical information in the electronic health record, independently interpreting results and/or communicating results to the patient/family/caregiver, or care coordinator.  Visit today included increased complexity associated with the care of the episodic problem addressed and managing the longitudinal care of the patient due to the serious and/or complex managed problem(s).      This note was generated with the assistance of  ambient listening technology. Verbal consent was obtained by the patient and accompanying visitor(s) for the recording of patient appointment to facilitate this note. I attest to having reviewed and edited the generated note for accuracy, though some syntax or spelling errors may persist. Please contact the author of this note for any clarification.      Cecilio Iniguez MD

## 2024-11-14 ENCOUNTER — HOSPITAL ENCOUNTER (OUTPATIENT)
Dept: RADIOLOGY | Facility: HOSPITAL | Age: 44
Discharge: HOME | End: 2024-11-14
Payer: COMMERCIAL

## 2024-11-14 DIAGNOSIS — Z01.818 PREOPERATIVE EXAMINATION: ICD-10-CM

## 2024-11-14 DIAGNOSIS — R93.89 ABNORMAL CHEST X-RAY: Primary | ICD-10-CM

## 2024-11-14 LAB
APTT PPP: 39.2 SEC (ref 21–32)
INR PPP: 1 (ref 0.8–1.2)
PROTHROMBIN TIME: 10.5 SEC (ref 9–12.5)

## 2024-11-14 PROCEDURE — 71046 X-RAY EXAM CHEST 2 VIEWS: CPT | Mod: TC,PO

## 2024-11-14 PROCEDURE — 71046 X-RAY EXAM CHEST 2 VIEWS: CPT | Mod: 26,,, | Performed by: RADIOLOGY

## 2024-11-18 ENCOUNTER — HOSPITAL ENCOUNTER (OUTPATIENT)
Dept: RADIOLOGY | Facility: HOSPITAL | Age: 44
Discharge: HOME | End: 2024-11-18
Payer: COMMERCIAL

## 2024-11-18 DIAGNOSIS — J98.4 CAVITATING MASS IN RIGHT UPPER LUNG LOBE: Primary | ICD-10-CM

## 2024-11-18 DIAGNOSIS — R93.89 ABNORMAL CHEST X-RAY: ICD-10-CM

## 2024-11-18 PROCEDURE — 25500020 PHARM REV CODE 255

## 2024-11-18 PROCEDURE — 71260 CT THORAX DX C+: CPT | Mod: 26,,, | Performed by: RADIOLOGY

## 2024-11-18 PROCEDURE — 71260 CT THORAX DX C+: CPT | Mod: TC

## 2024-11-18 RX ADMIN — IOHEXOL 100 ML: 350 INJECTION, SOLUTION INTRAVENOUS at 08:11

## 2024-11-20 ENCOUNTER — LAB VISIT (OUTPATIENT)
Dept: LAB | Facility: HOSPITAL | Age: 44
End: 2024-11-20
Attending: INTERNAL MEDICINE
Payer: COMMERCIAL

## 2024-11-20 ENCOUNTER — OFFICE VISIT (OUTPATIENT)
Dept: PULMONOLOGY | Facility: CLINIC | Age: 44
End: 2024-11-20
Payer: COMMERCIAL

## 2024-11-20 VITALS
HEART RATE: 107 BPM | SYSTOLIC BLOOD PRESSURE: 132 MMHG | OXYGEN SATURATION: 98 % | HEIGHT: 69 IN | WEIGHT: 172.81 LBS | BODY MASS INDEX: 25.6 KG/M2 | RESPIRATION RATE: 16 BRPM | DIASTOLIC BLOOD PRESSURE: 70 MMHG

## 2024-11-20 DIAGNOSIS — F17.210 SMOKING GREATER THAN 30 PACK YEARS: ICD-10-CM

## 2024-11-20 DIAGNOSIS — R59.0 MEDIASTINAL LYMPHADENOPATHY: Primary | ICD-10-CM

## 2024-11-20 DIAGNOSIS — J98.4 CAVITATING MASS IN RIGHT UPPER LUNG LOBE: ICD-10-CM

## 2024-11-20 DIAGNOSIS — R59.0 HILAR LYMPHADENOPATHY: ICD-10-CM

## 2024-11-20 DIAGNOSIS — J98.4 CAVITATING MASS IN RIGHT UPPER LUNG LOBE: Primary | ICD-10-CM

## 2024-11-20 LAB
CRP SERPL-MCNC: 134.4 MG/L (ref 0–8.2)
ERYTHROCYTE [SEDIMENTATION RATE] IN BLOOD BY PHOTOMETRIC METHOD: 32 MM/HR (ref 0–23)

## 2024-11-20 PROCEDURE — 99999 PR PBB SHADOW E&M-EST. PATIENT-LVL IV: CPT | Mod: PBBFAC,,, | Performed by: INTERNAL MEDICINE

## 2024-11-20 PROCEDURE — 1159F MED LIST DOCD IN RCRD: CPT | Mod: CPTII,S$GLB,, | Performed by: INTERNAL MEDICINE

## 2024-11-20 PROCEDURE — 86140 C-REACTIVE PROTEIN: CPT | Performed by: INTERNAL MEDICINE

## 2024-11-20 PROCEDURE — 99204 OFFICE O/P NEW MOD 45 MIN: CPT | Mod: S$GLB,,, | Performed by: INTERNAL MEDICINE

## 2024-11-20 PROCEDURE — 85652 RBC SED RATE AUTOMATED: CPT | Performed by: INTERNAL MEDICINE

## 2024-11-20 PROCEDURE — 3078F DIAST BP <80 MM HG: CPT | Mod: CPTII,S$GLB,, | Performed by: INTERNAL MEDICINE

## 2024-11-20 PROCEDURE — 3008F BODY MASS INDEX DOCD: CPT | Mod: CPTII,S$GLB,, | Performed by: INTERNAL MEDICINE

## 2024-11-20 PROCEDURE — 3075F SYST BP GE 130 - 139MM HG: CPT | Mod: CPTII,S$GLB,, | Performed by: INTERNAL MEDICINE

## 2024-11-20 PROCEDURE — 1160F RVW MEDS BY RX/DR IN RCRD: CPT | Mod: CPTII,S$GLB,, | Performed by: INTERNAL MEDICINE

## 2024-11-20 PROCEDURE — 36415 COLL VENOUS BLD VENIPUNCTURE: CPT | Performed by: INTERNAL MEDICINE

## 2024-11-20 RX ORDER — GABAPENTIN 300 MG/1
CAPSULE ORAL
COMMUNITY
Start: 2024-05-28

## 2024-11-20 RX ORDER — AMOXICILLIN AND CLAVULANATE POTASSIUM 875; 125 MG/1; MG/1
1 TABLET, FILM COATED ORAL EVERY 12 HOURS
Qty: 28 TABLET | Refills: 0 | Status: SHIPPED | OUTPATIENT
Start: 2024-11-20

## 2024-11-20 RX ORDER — TIZANIDINE 4 MG/1
TABLET ORAL
COMMUNITY
Start: 2024-05-30

## 2024-11-20 NOTE — PATIENT INSTRUCTIONS
Arrive at 8 am   Procedure 10 am   [address] [facility]   Do not eat or drink anything after 12 am

## 2024-11-20 NOTE — PROGRESS NOTES
Initial Outpatient Pulmonary Evaluation       SUBJECTIVE:     Chief Complaint   Patient presents with    Lung Mass       History of Present Illness:    Patient is a 44 y.o. male referred for evaluation of right upper lobe cavitating mass.      Chronic coughing with blood-tinged sputum.      Drinks 12 beers a day.      Works as a [employer].      He was having a preop evaluation for cervical spine surgery and chest x-ray reviewed right upper lobe mass.        64 PY       Review of Systems   Respiratory:  Positive for cough and sputum production.    Musculoskeletal:  Positive for arthralgias.       Review of patient's allergies indicates:  No Known Allergies    Current Outpatient Medications   Medication Sig Dispense Refill    gabapentin (NEURONTIN) 300 MG capsule Take by mouth.      tiZANidine (ZANAFLEX) 4 MG tablet SMARTSI Tablet(s) By Mouth Every Evening PRN      amoxicillin-clavulanate 875-125mg (AUGMENTIN) 875-125 mg per tablet Take 1 tablet by mouth every 12 (twelve) hours. 28 tablet 0    aspirin-caffeine 845-65 mg PwPk Take 1-2 packets by mouth daily as needed.      fluticasone propionate (FLONASE) 50 mcg/actuation nasal spray 1 spray (50 mcg total) by Each Nostril route once daily. 16 g 5    thiamine 100 MG tablet Take 1 tablet (100 mg total) by mouth once daily. 90 tablet 3     No current facility-administered medications for this visit.       Past Medical History:   Diagnosis Date    Alcohol abuse      Past Surgical History:   Procedure Laterality Date    CARPAL TUNNEL RELEASE Right     knee sx      mensicus repair     Family History   Problem Relation Name Age of Onset    Diabetes Mother      Prostate cancer Maternal Grandfather       Social History     Tobacco Use    Smoking status: Every Day     Current packs/day: 2.00     Average packs/day: 2.0 packs/day for 32.8 years (65.6 ttl pk-yrs)     Types: Cigarettes     Start date: 1992    Smokeless tobacco:  "Never   Substance Use Topics    Alcohol use: Yes     Alcohol/week: 15.0 standard drinks of alcohol     Types: 15 Shots of liquor per week     Comment: social weekly 4 wiskey    Drug use: No          OBJECTIVE:     Vital Signs (Most Recent)  Vital Signs  Pulse: 107  Resp: 16  SpO2: 98 %  BP: 132/70  Pain Score:   4  Pain Loc: Leg  Height and Weight  Height: 5' 9" (175.3 cm)  Weight: 78.4 kg (172 lb 13.5 oz)  BSA (Calculated - sq m): 1.95 sq meters  BMI (Calculated): 25.5  Weight in (lb) to have BMI = 25: 168.9]  Wt Readings from Last 2 Encounters:   11/20/24 78.4 kg (172 lb 13.5 oz)   11/13/24 80.3 kg (177 lb 0.5 oz)         Physical Exam:  Physical Exam   Constitutional: He is oriented to person, place, and time. He appears well-developed and well-nourished.   Pulmonary/Chest: He has decreased breath sounds.   Neurological: He is alert and oriented to person, place, and time.   Psychiatric: His behavior is normal.       Laboratory  Lab Results   Component Value Date    WBC 12.46 11/13/2024    RBC 4.55 (L) 11/13/2024    HGB 14.5 11/13/2024    HCT 45.5 11/13/2024     (H) 11/13/2024    MCH 31.9 (H) 11/13/2024    MCHC 31.9 (L) 11/13/2024    RDW 13.7 11/13/2024     (H) 11/13/2024    MPV 10.4 11/13/2024    GRAN 8.6 (H) 11/13/2024    GRAN 69.0 11/13/2024    LYMPH 2.3 11/13/2024    LYMPH 18.1 11/13/2024    MONO 1.1 (H) 11/13/2024    MONO 9.1 11/13/2024    EOS 0.3 11/13/2024    BASO 0.11 11/13/2024    EOSINOPHIL 2.6 11/13/2024    BASOPHIL 0.9 11/13/2024       BMP  Lab Results   Component Value Date     11/13/2024    K 4.5 11/13/2024     11/13/2024    CO2 27 11/13/2024    BUN 11 11/13/2024    CREATININE 0.8 11/13/2024    CALCIUM 10.3 11/13/2024    ANIONGAP 10 11/13/2024    ESTGFRAFRICA >60.0 09/13/2017    EGFRNONAA >60.0 09/13/2017    AST 52 (H) 09/08/2023    ALT 53 (H) 09/08/2023    PROT 7.9 09/08/2023       No results found for: "BNP"    Lab Results   Component Value Date    TSH 0.527 09/08/2023 " "      Lab Results   Component Value Date    SEDRATE 22 11/13/2024       No results found for: "CRP"    No results found for: "IGE"    No results found for: "ASPERGILLUS"  No results found for: "AFUMIGATUSCL"     No results found for: "ACE"    Diagnostic Results:  I have personally reviewed today the following studies :    CT CHEST WITH CONTRAST     CLINICAL HISTORY:  Abnormal xray: vague suprahilar density 3.4 cm, mass not excluded. Patient asymptomatic.; Abnormal findings on diagnostic imaging of other specified body structures     TECHNIQUE:  The chest was surveyed from the apices through the posterior costophrenic angles after administration of 100 cc of Omni 300 contrast.  Data was reconstructed for multiplanar images in axial, sagittal and coronal planes in for maximal intensity projection images in the axial plane.     COMPARISON:  11/14/2024     FINDINGS:  Base of Neck: No significant abnormality.     Airways: Patent.     Lungs: Cavitary mass with some component of postobstructive atelectasis seen within the right upper lobe.  Exact measurement is difficult but measures approximately 3.8 x 2 cm on axial images and 3.1 cm on sagittal images.  Mildly enlarged lymph nodes within the right hilum measuring up to 14 mm.  Cannot exclude primary or secondary.  Shotty nodes are seen throughout the left hilum and mediastinum.  Moderate emphysematous changes throughout the lungs greatest within the upper lobes.     Pleura: No pleural fluid.No pleural calcification.     Evie/Mediastinum: Mild large mint of left hilar lymph node measuring up to 14 mm.  Shotty nodes are seen throughout the left hilum and mediastinum.     Esophagus: Normal.     Heart/pericardium: Normal size.  No pericardial effusion or calcification.     Pulmonary vasculature: Pulmonary arteries distribute normally.  There are four pulmonary veins.     Aorta: Left-sided aortic arch with 3 arterial branches.  The aorta maintains normal caliber, contour and " course. There is no calcification of the thoracic aorta.  There is  no coronary artery calcification.     Thoracic soft tissues: Normal. Both breasts are present.     Bones: No acute fracture. No suspicious lytic or sclerotic lesion.     Upper Abdomen: No abnormality of the partially imaged upper abdomen.  Low attenuating lesion within the right adrenal gland measuring 17 mm and 10 Hounsfield units with small calcification is thought to reflect a benign adenoma     Impression:     Cavitary mass with some component of postobstructive atelectasis seen within the right upper lobe. Exact measurement is difficult but measures approximately 3.8 x 2 cm on axial images and 3.1 cm on sagittal images.  Primary neoplasm is suspected.  Recommend pulmonary consult with PET scan and/or tissue sampling.      ASSESSMENT/PLAN:     Mediastinal lymphadenopathy  -     C-REACTIVE PROTEIN; Future; Expected date: 11/20/2024  -     Sedimentation rate; Future; Expected date: 11/20/2024  -     amoxicillin-clavulanate 875-125mg (AUGMENTIN) 875-125 mg per tablet; Take 1 tablet by mouth every 12 (twelve) hours.  Dispense: 28 tablet; Refill: 0  -     Case Request Endoscopy: ROBOTIC BRONCHOSCOPY    Cavitating mass in right upper lung lobe  -     Ambulatory referral/consult to Pulmonology  -     C-REACTIVE PROTEIN; Future; Expected date: 11/20/2024  -     Sedimentation rate; Future; Expected date: 11/20/2024  -     amoxicillin-clavulanate 875-125mg (AUGMENTIN) 875-125 mg per tablet; Take 1 tablet by mouth every 12 (twelve) hours.  Dispense: 28 tablet; Refill: 0  -     Case Request Endoscopy: ROBOTIC BRONCHOSCOPY  -     Vital signs; Standing  -     Verify informed consent; Standing  -     Assess per unit routine; Standing  -     Insert peripheral IV if not present; Standing  -     Remove glasses and/or dentures; Standing  -     Undress from the waist up; Standing  -     Transport patient on stretcher with oxygen available; Standing  -     Notify  Physician; Standing  -     Pulse Oximetry Q4H; Standing  -     Full code; Standing  -     Place in Outpatient; Standing  -     FL Less Than 1 Hour; Standing  -     X-Ray Chest AP Portable; Standing    Hilar lymphadenopathy  -     C-REACTIVE PROTEIN; Future; Expected date: 11/20/2024  -     Sedimentation rate; Future; Expected date: 11/20/2024  -     amoxicillin-clavulanate 875-125mg (AUGMENTIN) 875-125 mg per tablet; Take 1 tablet by mouth every 12 (twelve) hours.  Dispense: 28 tablet; Refill: 0  -     Case Request Endoscopy: ROBOTIC BRONCHOSCOPY    Smoking greater than 30 pack years        Scheduled for robotic bronchoscopy right upper lobe and EBUS TBNA biopsy of hilar and mediastinal lymph node.      Augmentin x2 weeks.      Navigational CT scan prior to the procedure.    I advised patient that his surgery needs to be delayed until a diagnosis is made.      I will forward today's message and tolerate to reach his surgeon Sim Mendoza MD  Follow up in about 1 month (around 12/20/2024).    This note was prepared using voice recognition system and is likely to have sound alike errors that may have been overlooked even after proof reading.  Please call me with any questions    Discussed diagnosis, its evaluation, treatment and usual course. All questions answered.    Thank you for the courtesy of participating in the care of this patient    Kathy Hurd MD

## 2024-12-05 ENCOUNTER — ANESTHESIA (OUTPATIENT)
Dept: ENDOSCOPY | Facility: HOSPITAL | Age: 44
End: 2024-12-05
Payer: COMMERCIAL

## 2024-12-05 ENCOUNTER — HOSPITAL ENCOUNTER (OUTPATIENT)
Facility: HOSPITAL | Age: 44
Discharge: HOME | End: 2024-12-05
Attending: INTERNAL MEDICINE | Admitting: INTERNAL MEDICINE
Payer: COMMERCIAL

## 2024-12-05 ENCOUNTER — ANESTHESIA EVENT (OUTPATIENT)
Dept: ENDOSCOPY | Facility: HOSPITAL | Age: 44
End: 2024-12-05
Payer: COMMERCIAL

## 2024-12-05 DIAGNOSIS — J98.4 CAVITATING MASS IN RIGHT UPPER LUNG LOBE: ICD-10-CM

## 2024-12-05 DIAGNOSIS — R59.0 MEDIASTINAL LYMPHADENOPATHY: ICD-10-CM

## 2024-12-05 PROCEDURE — 25000242 PHARM REV CODE 250 ALT 637 W/ HCPCS: Performed by: INTERNAL MEDICINE

## 2024-12-05 PROCEDURE — 31629 BRONCHOSCOPY/NEEDLE BX EACH: CPT | Mod: 51,,, | Performed by: INTERNAL MEDICINE

## 2024-12-05 PROCEDURE — 87205 SMEAR GRAM STAIN: CPT | Performed by: INTERNAL MEDICINE

## 2024-12-05 PROCEDURE — 31629 BRONCHOSCOPY/NEEDLE BX EACH: CPT | Performed by: INTERNAL MEDICINE

## 2024-12-05 PROCEDURE — 31627 NAVIGATIONAL BRONCHOSCOPY: CPT | Performed by: INTERNAL MEDICINE

## 2024-12-05 PROCEDURE — 37000008 HC ANESTHESIA 1ST 15 MINUTES: Performed by: INTERNAL MEDICINE

## 2024-12-05 PROCEDURE — 31652 BRONCH EBUS SAMPLNG 1/2 NODE: CPT | Mod: ,,, | Performed by: INTERNAL MEDICINE

## 2024-12-05 PROCEDURE — 31624 DX BRONCHOSCOPE/LAVAGE: CPT | Performed by: INTERNAL MEDICINE

## 2024-12-05 PROCEDURE — 31628 BRONCHOSCOPY/LUNG BX EACH: CPT | Mod: 51,,, | Performed by: INTERNAL MEDICINE

## 2024-12-05 PROCEDURE — 88112 CYTOPATH CELL ENHANCE TECH: CPT | Mod: 26,59,, | Performed by: PATHOLOGY

## 2024-12-05 PROCEDURE — 88172 CYTP DX EVAL FNA 1ST EA SITE: CPT | Mod: 59 | Performed by: PATHOLOGY

## 2024-12-05 PROCEDURE — 63600175 PHARM REV CODE 636 W HCPCS: Performed by: NURSE ANESTHETIST, CERTIFIED REGISTERED

## 2024-12-05 PROCEDURE — 31624 DX BRONCHOSCOPE/LAVAGE: CPT | Mod: 51,,, | Performed by: INTERNAL MEDICINE

## 2024-12-05 PROCEDURE — 31652 BRONCH EBUS SAMPLNG 1/2 NODE: CPT | Performed by: INTERNAL MEDICINE

## 2024-12-05 PROCEDURE — 27200944 HC BRONCH FORCEPS DISPOSABLE: Performed by: INTERNAL MEDICINE

## 2024-12-05 PROCEDURE — 27202131 HC NEEDLE, FNB SINGLE (ANY): Performed by: INTERNAL MEDICINE

## 2024-12-05 PROCEDURE — 25000003 PHARM REV CODE 250: Performed by: NURSE ANESTHETIST, CERTIFIED REGISTERED

## 2024-12-05 PROCEDURE — 87102 FUNGUS ISOLATION CULTURE: CPT | Performed by: INTERNAL MEDICINE

## 2024-12-05 PROCEDURE — 88173 CYTOPATH EVAL FNA REPORT: CPT | Mod: 26,,, | Performed by: PATHOLOGY

## 2024-12-05 PROCEDURE — 87015 SPECIMEN INFECT AGNT CONCNTJ: CPT | Performed by: INTERNAL MEDICINE

## 2024-12-05 PROCEDURE — 31627 NAVIGATIONAL BRONCHOSCOPY: CPT | Mod: ,,, | Performed by: INTERNAL MEDICINE

## 2024-12-05 PROCEDURE — 88173 CYTOPATH EVAL FNA REPORT: CPT | Mod: 59 | Performed by: PATHOLOGY

## 2024-12-05 PROCEDURE — 37000009 HC ANESTHESIA EA ADD 15 MINS: Performed by: INTERNAL MEDICINE

## 2024-12-05 PROCEDURE — 88177 CYTP FNA EVAL EA ADDL: CPT | Performed by: PATHOLOGY

## 2024-12-05 PROCEDURE — 88305 TISSUE EXAM BY PATHOLOGIST: CPT | Performed by: PATHOLOGY

## 2024-12-05 PROCEDURE — 88112 CYTOPATH CELL ENHANCE TECH: CPT | Performed by: PATHOLOGY

## 2024-12-05 PROCEDURE — 87116 MYCOBACTERIA CULTURE: CPT | Performed by: INTERNAL MEDICINE

## 2024-12-05 PROCEDURE — 87210 SMEAR WET MOUNT SALINE/INK: CPT | Performed by: INTERNAL MEDICINE

## 2024-12-05 PROCEDURE — 88305 TISSUE EXAM BY PATHOLOGIST: CPT | Mod: 26,,, | Performed by: PATHOLOGY

## 2024-12-05 PROCEDURE — 31628 BRONCHOSCOPY/LUNG BX EACH: CPT | Performed by: INTERNAL MEDICINE

## 2024-12-05 PROCEDURE — 87206 SMEAR FLUORESCENT/ACID STAI: CPT | Performed by: INTERNAL MEDICINE

## 2024-12-05 PROCEDURE — 27202059 HC NEEDLE, FNA (ANY): Performed by: INTERNAL MEDICINE

## 2024-12-05 PROCEDURE — 31654 BRONCH EBUS IVNTJ PERPH LES: CPT | Mod: ,,, | Performed by: INTERNAL MEDICINE

## 2024-12-05 PROCEDURE — 31654 BRONCH EBUS IVNTJ PERPH LES: CPT | Performed by: INTERNAL MEDICINE

## 2024-12-05 PROCEDURE — 87070 CULTURE OTHR SPECIMN AEROBIC: CPT | Performed by: INTERNAL MEDICINE

## 2024-12-05 RX ORDER — PROPOFOL 10 MG/ML
VIAL (ML) INTRAVENOUS
Status: DISCONTINUED | OUTPATIENT
Start: 2024-12-05 | End: 2024-12-05

## 2024-12-05 RX ORDER — ROCURONIUM BROMIDE 10 MG/ML
INJECTION, SOLUTION INTRAVENOUS
Status: DISCONTINUED | OUTPATIENT
Start: 2024-12-05 | End: 2024-12-05

## 2024-12-05 RX ORDER — DEXAMETHASONE SODIUM PHOSPHATE 4 MG/ML
INJECTION, SOLUTION INTRA-ARTICULAR; INTRALESIONAL; INTRAMUSCULAR; INTRAVENOUS; SOFT TISSUE
Status: DISCONTINUED | OUTPATIENT
Start: 2024-12-05 | End: 2024-12-05

## 2024-12-05 RX ORDER — LIDOCAINE HYDROCHLORIDE 10 MG/ML
INJECTION, SOLUTION EPIDURAL; INFILTRATION; INTRACAUDAL; PERINEURAL
Status: DISCONTINUED | OUTPATIENT
Start: 2024-12-05 | End: 2024-12-05

## 2024-12-05 RX ORDER — SUCCINYLCHOLINE CHLORIDE 20 MG/ML
INJECTION INTRAMUSCULAR; INTRAVENOUS
Status: DISCONTINUED | OUTPATIENT
Start: 2024-12-05 | End: 2024-12-05

## 2024-12-05 RX ORDER — IPRATROPIUM BROMIDE AND ALBUTEROL SULFATE 2.5; .5 MG/3ML; MG/3ML
3 SOLUTION RESPIRATORY (INHALATION) ONCE
Status: COMPLETED | OUTPATIENT
Start: 2024-12-05 | End: 2024-12-05

## 2024-12-05 RX ORDER — ONDANSETRON HYDROCHLORIDE 2 MG/ML
INJECTION, SOLUTION INTRAVENOUS
Status: DISCONTINUED | OUTPATIENT
Start: 2024-12-05 | End: 2024-12-05

## 2024-12-05 RX ADMIN — ONDANSETRON 4 MG: 2 INJECTION INTRAMUSCULAR; INTRAVENOUS at 10:12

## 2024-12-05 RX ADMIN — SODIUM CHLORIDE, POTASSIUM CHLORIDE, SODIUM LACTATE AND CALCIUM CHLORIDE: 600; 310; 30; 20 INJECTION, SOLUTION INTRAVENOUS at 10:12

## 2024-12-05 RX ADMIN — DEXAMETHASONE SODIUM PHOSPHATE 8 MG: 4 INJECTION, SOLUTION INTRA-ARTICULAR; INTRALESIONAL; INTRAMUSCULAR; INTRAVENOUS; SOFT TISSUE at 10:12

## 2024-12-05 RX ADMIN — SUGAMMADEX 200 MG: 100 INJECTION, SOLUTION INTRAVENOUS at 12:12

## 2024-12-05 RX ADMIN — SUCCINYLCHOLINE CHLORIDE 140 MG: 20 INJECTION, SOLUTION INTRAMUSCULAR; INTRAVENOUS; PARENTERAL at 10:12

## 2024-12-05 RX ADMIN — PROPOFOL 180 MG: 10 INJECTION, EMULSION INTRAVENOUS at 10:12

## 2024-12-05 RX ADMIN — ROCURONIUM BROMIDE 5 MG: 10 SOLUTION INTRAVENOUS at 10:12

## 2024-12-05 RX ADMIN — LIDOCAINE HYDROCHLORIDE 50 MG: 10 SOLUTION INTRAVENOUS at 10:12

## 2024-12-05 RX ADMIN — ROCURONIUM BROMIDE 10 MG: 10 SOLUTION INTRAVENOUS at 10:12

## 2024-12-05 RX ADMIN — IPRATROPIUM BROMIDE AND ALBUTEROL SULFATE 3 ML: 2.5; .5 SOLUTION RESPIRATORY (INHALATION) at 12:12

## 2024-12-05 RX ADMIN — ROCURONIUM BROMIDE 35 MG: 10 SOLUTION INTRAVENOUS at 10:12

## 2024-12-05 RX ADMIN — ROCURONIUM BROMIDE 50 MG: 10 SOLUTION INTRAVENOUS at 11:12

## 2024-12-05 NOTE — INTERVAL H&P NOTE
The patient has been examined and the H&P has been reviewed:    I concur with the findings and no changes have occurred since H&P was written.    Procedure risks, benefits and alternative options discussed and understood by patient/family.      Robotic bronchoscopy with transbronchial FNA forceps biopsy EBUS TBNA biopsy.

## 2024-12-05 NOTE — DISCHARGE SUMMARY
[facility] ([facility])  Pulmonology  Discharge Summary      Patient Name: Pravin Padilla  MRN: [id number]  Admission Date: 12/5/2024  Hospital Length of Stay: 0 days  Discharge Date and Time:  12/05/2024 1:12 PM  Attending Physician: Kathy Hurd MD   Discharging Provider: Kathy Hurd MD  Primary Care Provider: Royal Dunham MD    HPI:  lung mass sp biopsy     Procedure(s) (LRB):  ROBOTIC BRONCHOSCOPY (Bilateral)  BRONCHOSCOPY (N/A)  ENDOBRONCHIAL ULTRASOUND (EBUS)    Indwelling Lines/Drains at Time of Discharge:   Lines/Drains/Airways       None                     Pending Diagnostic Studies:       Procedure Component Value Units Date/Time    Cytology-FNA Non-Radiology Clinician Performed w/o on site [[id number]] Collected: 12/05/24 1231    Order Status: Sent Lab Status: In process Updated: 12/05/24 1232    X-Ray Chest AP Portable [[id number]] Resulted: 12/05/24 1301    Order Status: Sent Lab Status: In process Updated: 12/05/24 1301              Discharged Condition: stable    Disposition: Home or Self Care    Follow Up:   Follow-up Information       Kathy Hurd MD Follow up in 2 week(s).    Specialty: Pulmonary Disease  Contact information:  [address] [place] [address] [address] [place] [address] [phone]                           Patient Instructions:   You might experience today a low-grade fever please take acetaminophen 650 mg every 8 hours as needed    You might experience today blood tinged phlegm, this should subside within 24-48 hours     For severe blood or respiratory distress please proceed to the emergency room    Medications:  Reconciled Home Medications:      Medication List        CONTINUE taking these medications      amoxicillin-clavulanate 875-125mg 875-125 mg per tablet  Commonly known as: AUGMENTIN  Take 1 tablet by mouth every 12 (twelve) hours.     aspirin-caffeine 845-65 mg Pwpk  Take 1-2 packets by mouth daily as needed.     fluticasone propionate 50  mcg/actuation nasal spray  Commonly known as: FLONASE  1 spray (50 mcg total) by Each Nostril route once daily.     gabapentin 300 MG capsule  Commonly known as: NEURONTIN  Take by mouth.     tiZANidine 4 MG tablet  Commonly known as: ZANAFLEX  SMARTSI Tablet(s) By Mouth Every Evening PRN              Kathy Hurd MD  Pulmonology  [facility] ([facility])

## 2024-12-05 NOTE — OP NOTE
Bilateral airways inspected with regular bronchoscopy.  Copious clear secretions were noted bilaterally and suctioned.  Scope was change to robotic scope.  With the assistance of robotic imaging radial EBUS and fluoroscopy the right upper lobe lesion was localized.  Under fluoroscopy guidance and with radial EBUS guidance and robotic imaging assistance 6 passes of transbronchial fine-needle aspiration were performed with needle size 21 at the right upper lobe lesion.  Subsequently under fluoroscopy guidance and radial EBUS guidance and robotic imaging assistance 6 passes of transbronchial forceps biopsy were performed at the right upper lobe lesion.  30 mL of normal saline were instilled and 20 mL of sanguinous fluid was suctioned back and sent for cytology and microbiology.    Scope was changed to EBUS scope.  Inspection of mediastinal and hilar lymph node stations revealed a 1 cm right hilar 11 R  lymph node.  Four passes of EBUS TBNA biopsy with needle size 21 were performed.  Good hemostasis noted.

## 2024-12-05 NOTE — ANESTHESIA PROCEDURE NOTES
Intubation    Date/Time: 12/5/2024 10:37 AM    Performed by: Yong Alberto CRNA  Authorized by: Jairo Mccall MD    Intubation:     Induction:  Rapid sequence induction    Intubated:  Postinduction    Mask Ventilation:  Not attempted    Attempts:  1    Attempted By:  CRNA    Method of Intubation:  Video laryngoscopy    Blade:  Echols 4    Difficult Airway Encountered?: No      Complications:  None    Airway Device:  Oral endotracheal tube    Airway Device Size:  8.5    Style/Cuff Inflation:  Cuffed (inflated to minimal occlusive pressure)    Tube secured:  22    Secured at:  The lips    Placement Verified By:  Capnometry    Complicating Factors:  None    Findings Post-Intubation:  BS equal bilateral and atraumatic/condition of teeth unchanged

## 2024-12-05 NOTE — BRIEF OP NOTE
Bilateral airways inspected with regular bronchoscopy.  Copious clear secretions were noted bilaterally and suctioned.  Scope was change to robotic scope.  With the assistance of robotic imaging radial EBUS and fluoroscopy the right upper lobe lesion was localized.  Under fluoroscopy guidance and with radial EBUS guidance and robotic imaging assistance 6 passes of transbronchial fine-needle aspiration were performed with needle size 21 at the right upper lobe lesion.  Subsequently under fluoroscopy guidance and radial EBUS guidance and robotic imaging assistance 6 passes of transbronchial forceps biopsy were performed at the right upper lobe lesion.  30 mL of normal saline were instilled and 20 mL of sanguinous fluid was suctioned back and sent for cytology and microbiology.    Scope was changed to EBUS scope.  Inspection of mediastinal and hilar lymph node stations revealed a 1 cm right hilar 11 R  lymph node.  Four passes of EBUS TBNA biopsy with needle size 21 were performed.  Good hemostasis

## 2024-12-05 NOTE — TRANSFER OF CARE
"Anesthesia Transfer of Care Note    Patient: Pravin Padilla    Procedure(s) Performed: Procedure(s) (LRB):  ROBOTIC BRONCHOSCOPY (Bilateral)  BRONCHOSCOPY (N/A)  ENDOBRONCHIAL ULTRASOUND (EBUS)    Patient location: PACU    Anesthesia Type: general    Transport from OR: Transported from OR on room air with adequate spontaneous ventilation    Post pain: adequate analgesia    Post assessment: no apparent anesthetic complications and tolerated procedure well    Post vital signs: stable    Level of consciousness: awake    Nausea/Vomiting: no nausea/vomiting    Complications: none    Transfer of care protocol was followed    Last vitals: Visit Vitals  /79   Pulse 106   Temp 37.3 °C (99.1 °F)   Resp 20   Ht 5' 9" (1.753 m)   Wt 77.1 kg (170 lb)   SpO2 100%   BMI 25.10 kg/m²     "

## 2024-12-05 NOTE — ANESTHESIA PREPROCEDURE EVALUATION
12/05/2024  Pravin Padilla is a 44 y.o., male.      Pre-op Assessment    I have reviewed the Patient Summary Reports.     I have reviewed the Nursing Notes. I have reviewed the NPO Status.   I have reviewed the Medications.     Review of Systems  Anesthesia Hx:  No problems with previous Anesthesia             Denies Family Hx of Anesthesia complications.    Denies Personal Hx of Anesthesia complications.                    Social:  Alcohol Use, Smoker Alcohol abuse  12 pack of beer daily. Last drink of alcohol was 1930 12/4/24.    Down to 1ppd from 2ppd. Smoked since 12 yrs old      Hematology/Oncology:  Hematology Normal   Oncology Normal                                   Cardiovascular:      Denies Hypertension.   Denies MI.     Denies CABG/stent.     Denies CHF.       ECG has been reviewed. EKG 11/2024:  Normal sinus rhythm 93  Normal ECG   No previous ECGs available                              Pulmonary:    Denies COPD.  Denies Asthma.     Denies Sleep Apnea.                Renal/:  Renal/ Normal                 Hepatic/GI:      Denies GERD. Denies Liver Disease.  Denies Hepatitis.              Musculoskeletal:  Musculoskeletal Normal                Neurological:    Denies CVA.    Denies Seizures.                                Endocrine:  Denies Diabetes. Denies Hypothyroidism.  Denies Hyperthyroidism.         Psych:    depression            Physical Exam  General: Well nourished    Airway:  Mallampati: II   Mouth Opening: Normal    Dental:  Intact    Chest/Lungs:  Clear to auscultation, Normal Respiratory Rate    Heart:  Rate: Normal  Rhythm: Regular Rhythm    Anesthesia Plan  Type of Anesthesia, risks & benefits discussed:    Anesthesia Type: Gen ETT  Intra-op Monitoring Plan: Standard ASA Monitors  Induction:  IV  Airway Plan: Direct and Video, Post-Induction  Informed Consent: Informed  consent signed with the Patient and all parties understand the risks and agree with anesthesia plan.  All questions answered.   ASA Score: 2  Day of Surgery Review of History & Physical: H&P Update referred to the surgeon/provider.    Ready For Surgery From Anesthesia Perspective.   .

## 2024-12-05 NOTE — ANESTHESIA POSTPROCEDURE EVALUATION
Anesthesia Post Evaluation    Patient: Pravin Padilla    Procedure(s) Performed: Procedure(s) (LRB):  ROBOTIC BRONCHOSCOPY (Bilateral)  BRONCHOSCOPY (N/A)  ENDOBRONCHIAL ULTRASOUND (EBUS)    Final Anesthesia Type: general      Patient location during evaluation: PACU  Patient participation: Yes- Able to Participate  Level of consciousness: awake  Post-procedure vital signs: reviewed and stable  Pain management: adequate  Airway patency: patent    PONV status at discharge: No PONV  Anesthetic complications: no      Cardiovascular status: blood pressure returned to baseline and hemodynamically stable  Respiratory status: unassisted and spontaneous ventilation  Hydration status: euvolemic  Follow-up not needed.                No case tracking events are documented in the log.      Pain/Sammy Score: No data recorded

## 2024-12-06 VITALS
TEMPERATURE: 98 F | DIASTOLIC BLOOD PRESSURE: 89 MMHG | SYSTOLIC BLOOD PRESSURE: 122 MMHG | OXYGEN SATURATION: 98 % | HEIGHT: 69 IN | RESPIRATION RATE: 17 BRPM | WEIGHT: 170 LBS | HEART RATE: 90 BPM | BODY MASS INDEX: 25.18 KG/M2

## 2024-12-06 LAB — KOH PREP SPEC: NORMAL

## 2024-12-07 LAB
ACID FAST MOD KINY STN SPEC: NORMAL
MYCOBACTERIUM SPEC QL CULT: NORMAL

## 2024-12-09 ENCOUNTER — PATIENT MESSAGE (OUTPATIENT)
Dept: PULMONOLOGY | Facility: CLINIC | Age: 44
End: 2024-12-09
Payer: COMMERCIAL

## 2024-12-09 DIAGNOSIS — R59.0 MEDIASTINAL LYMPHADENOPATHY: ICD-10-CM

## 2024-12-09 DIAGNOSIS — J98.4 CAVITATING MASS IN RIGHT UPPER LUNG LOBE: ICD-10-CM

## 2024-12-09 DIAGNOSIS — R59.0 HILAR LYMPHADENOPATHY: ICD-10-CM

## 2024-12-09 LAB
BACTERIA SPEC AEROBE CULT: NO GROWTH
FUNGUS SPEC CULT: NORMAL
GRAM STN SPEC: NORMAL

## 2024-12-09 RX ORDER — AMOXICILLIN AND CLAVULANATE POTASSIUM 875; 125 MG/1; MG/1
1 TABLET, FILM COATED ORAL EVERY 12 HOURS
Qty: 14 TABLET | Refills: 0 | Status: SHIPPED | OUTPATIENT
Start: 2024-12-09

## 2024-12-10 DIAGNOSIS — J44.9 CHRONIC OBSTRUCTIVE PULMONARY DISEASE, UNSPECIFIED COPD TYPE: ICD-10-CM

## 2024-12-10 DIAGNOSIS — J98.4 CAVITATING MASS IN RIGHT UPPER LUNG LOBE: Primary | ICD-10-CM

## 2024-12-10 LAB
ADEQUACY: NORMAL
COMMENT: NORMAL
FINAL PATHOLOGIC DIAGNOSIS: NORMAL
Lab: NORMAL

## 2024-12-16 ENCOUNTER — TELEPHONE (OUTPATIENT)
Dept: PULMONOLOGY | Facility: CLINIC | Age: 44
End: 2024-12-16
Payer: COMMERCIAL

## 2024-12-17 ENCOUNTER — LAB VISIT (OUTPATIENT)
Dept: LAB | Facility: HOSPITAL | Age: 44
End: 2024-12-17
Payer: COMMERCIAL

## 2024-12-17 ENCOUNTER — OFFICE VISIT (OUTPATIENT)
Dept: INTERNAL MEDICINE | Facility: CLINIC | Age: 44
End: 2024-12-17
Payer: COMMERCIAL

## 2024-12-17 VITALS
BODY MASS INDEX: 26.91 KG/M2 | DIASTOLIC BLOOD PRESSURE: 94 MMHG | SYSTOLIC BLOOD PRESSURE: 148 MMHG | OXYGEN SATURATION: 98 % | WEIGHT: 181.69 LBS | TEMPERATURE: 97 F | HEIGHT: 69 IN | HEART RATE: 91 BPM

## 2024-12-17 DIAGNOSIS — M47.22 CERVICAL SPONDYLOSIS WITH RADICULOPATHY: Primary | ICD-10-CM

## 2024-12-17 DIAGNOSIS — Z01.818 PREOPERATIVE EXAMINATION: ICD-10-CM

## 2024-12-17 DIAGNOSIS — M47.22 CERVICAL SPONDYLOSIS WITH RADICULOPATHY: ICD-10-CM

## 2024-12-17 DIAGNOSIS — Z79.899 OTHER LONG TERM (CURRENT) DRUG THERAPY: ICD-10-CM

## 2024-12-17 LAB
ANION GAP SERPL CALC-SCNC: 11 MMOL/L (ref 8–16)
BASOPHILS # BLD AUTO: 0.07 K/UL (ref 0–0.2)
BASOPHILS NFR BLD: 1 % (ref 0–1.9)
BILIRUB UR QL STRIP: NEGATIVE
BUN SERPL-MCNC: 8 MG/DL (ref 6–20)
CALCIUM SERPL-MCNC: 9.6 MG/DL (ref 8.7–10.5)
CHLORIDE SERPL-SCNC: 104 MMOL/L (ref 95–110)
CLARITY UR REFRACT.AUTO: CLEAR
CO2 SERPL-SCNC: 26 MMOL/L (ref 23–29)
COLOR UR AUTO: YELLOW
CREAT SERPL-MCNC: 0.8 MG/DL (ref 0.5–1.4)
DIFFERENTIAL METHOD BLD: ABNORMAL
EOSINOPHIL # BLD AUTO: 0.4 K/UL (ref 0–0.5)
EOSINOPHIL NFR BLD: 5.8 % (ref 0–8)
ERYTHROCYTE [DISTWIDTH] IN BLOOD BY AUTOMATED COUNT: 13.6 % (ref 11.5–14.5)
ERYTHROCYTE [SEDIMENTATION RATE] IN BLOOD BY PHOTOMETRIC METHOD: 9 MM/HR (ref 0–23)
EST. GFR  (NO RACE VARIABLE): >60 ML/MIN/1.73 M^2
GLUCOSE SERPL-MCNC: 79 MG/DL (ref 70–110)
GLUCOSE UR QL STRIP: NEGATIVE
HCT VFR BLD AUTO: 46.4 % (ref 40–54)
HGB BLD-MCNC: 15 G/DL (ref 14–18)
HGB UR QL STRIP: NEGATIVE
IMM GRANULOCYTES # BLD AUTO: 0.02 K/UL (ref 0–0.04)
IMM GRANULOCYTES NFR BLD AUTO: 0.3 % (ref 0–0.5)
KETONES UR QL STRIP: NEGATIVE
LEUKOCYTE ESTERASE UR QL STRIP: NEGATIVE
LYMPHOCYTES # BLD AUTO: 2.2 K/UL (ref 1–4.8)
LYMPHOCYTES NFR BLD: 30.7 % (ref 18–48)
MCH RBC QN AUTO: 31.9 PG (ref 27–31)
MCHC RBC AUTO-ENTMCNC: 32.3 G/DL (ref 32–36)
MCV RBC AUTO: 99 FL (ref 82–98)
MONOCYTES # BLD AUTO: 0.7 K/UL (ref 0.3–1)
MONOCYTES NFR BLD: 9.6 % (ref 4–15)
NEUTROPHILS # BLD AUTO: 3.8 K/UL (ref 1.8–7.7)
NEUTROPHILS NFR BLD: 52.6 % (ref 38–73)
NITRITE UR QL STRIP: NEGATIVE
NRBC BLD-RTO: 0 /100 WBC
PH UR STRIP: 6 [PH] (ref 5–8)
PLATELET # BLD AUTO: 365 K/UL (ref 150–450)
PMV BLD AUTO: 10.5 FL (ref 9.2–12.9)
POTASSIUM SERPL-SCNC: 4.6 MMOL/L (ref 3.5–5.1)
PROT UR QL STRIP: NEGATIVE
RBC # BLD AUTO: 4.7 M/UL (ref 4.6–6.2)
SODIUM SERPL-SCNC: 141 MMOL/L (ref 136–145)
SP GR UR STRIP: 1.01 (ref 1–1.03)
URN SPEC COLLECT METH UR: NORMAL
WBC # BLD AUTO: 7.22 K/UL (ref 3.9–12.7)

## 2024-12-17 PROCEDURE — 85652 RBC SED RATE AUTOMATED: CPT

## 2024-12-17 PROCEDURE — 36415 COLL VENOUS BLD VENIPUNCTURE: CPT | Mod: PO

## 2024-12-17 PROCEDURE — 85730 THROMBOPLASTIN TIME PARTIAL: CPT

## 2024-12-17 PROCEDURE — 3080F DIAST BP >= 90 MM HG: CPT | Mod: CPTII,S$GLB,,

## 2024-12-17 PROCEDURE — 1159F MED LIST DOCD IN RCRD: CPT | Mod: CPTII,S$GLB,,

## 2024-12-17 PROCEDURE — 85025 COMPLETE CBC W/AUTO DIFF WBC: CPT

## 2024-12-17 PROCEDURE — 81003 URINALYSIS AUTO W/O SCOPE: CPT | Mod: 91

## 2024-12-17 PROCEDURE — 3008F BODY MASS INDEX DOCD: CPT | Mod: CPTII,S$GLB,,

## 2024-12-17 PROCEDURE — 99213 OFFICE O/P EST LOW 20 MIN: CPT | Mod: S$GLB,,,

## 2024-12-17 PROCEDURE — 99999 PR PBB SHADOW E&M-EST. PATIENT-LVL III: CPT | Mod: PBBFAC,,,

## 2024-12-17 PROCEDURE — 80048 BASIC METABOLIC PNL TOTAL CA: CPT

## 2024-12-17 PROCEDURE — 85610 PROTHROMBIN TIME: CPT

## 2024-12-17 PROCEDURE — 3077F SYST BP >= 140 MM HG: CPT | Mod: CPTII,S$GLB,,

## 2024-12-17 RX ORDER — CYCLOBENZAPRINE HCL 5 MG
5 TABLET ORAL
COMMUNITY
Start: 2024-12-03

## 2024-12-17 NOTE — PROGRESS NOTES
Patient ID: Pravin Padilla is a 44 y.o. male.    Chief Complaint: Pre-op Exam    History of Present Illness    CHIEF COMPLAINT:  - Mr. Padilla presents for preoperative evaluation and to complete necessary labs for an upcoming surgery.    HPI:  Mr. Padilla has been scheduled for surgery on . A pulmonary evaluation has been completed, with results faxed to the pulmonologist. Mr. Padilla needs to redo labs as the previous results will  before the surgery date.       ROS:  General: -fever, -chills, -fatigue, -weight gain, -weight loss  Eyes: -vision changes, -redness, -discharge  ENT: -ear pain, -nasal congestion, -sore throat  Cardiovascular: -chest pain, -palpitations, -lower extremity edema  Respiratory: -cough, -shortness of breath  Gastrointestinal: -abdominal pain, -nausea, -vomiting, -diarrhea, -constipation, -blood in stool  Genitourinary: -dysuria, -hematuria, -frequency  Musculoskeletal: -joint pain, -muscle pain  Skin: -rash, -lesion  Neurological: -headache, -dizziness, -numbness, -tingling  Psychiatric: -anxiety, -depression, -sleep difficulty         Pmh, Psh, Family Hx, Social Hx updated in Epic Tabs today.         2024     7:46 AM 2024     2:04 PM   Depression Patient Health Questionnaire   Over the last two weeks how often have you been bothered by little interest or pleasure in doing things Not at all Not at all   Over the last two weeks how often have you been bothered by feeling down, depressed or hopeless Not at all Not at all   PHQ-2 Total Score 0 0       Active Problem List with Overview Notes    Diagnosis Date Noted    Cavitating mass in right upper lung lobe 2024    Mediastinal lymphadenopathy 2024    Alcohol abuse 2023    Depression 2018       Past Medical History:   Diagnosis Date    Alcohol abuse        Past Surgical History:   Procedure Laterality Date    BRONCHOSCOPY N/A 2024    Procedure: BRONCHOSCOPY;  Surgeon: Vannessa  Kathy BERKOWITZ MD;  Location: [facility] ENDO;  Service: Pulmonary;  Laterality: N/A;    CARPAL TUNNEL RELEASE Right     ENDOBRONCHIAL ULTRASOUND  2024    Procedure: ENDOBRONCHIAL ULTRASOUND (EBUS);  Surgeon: Kathy Hurd MD;  Location: [facility] ENDO;  Service: Pulmonary;;    knee sx      mensicus repair    ROBOTIC BRONCHOSCOPY Bilateral 2024    Procedure: ROBOTIC BRONCHOSCOPY;  Surgeon: Kathy Hurd MD;  Location: [facility] ENDO;  Service: Pulmonary;  Laterality: Bilateral;    VASECTOMY         Family History   Problem Relation Name Age of Onset    Diabetes Mother      Prostate cancer Maternal Grandfather         Social History     Socioeconomic History    Marital status:    Tobacco Use    Smoking status: Every Day     Current packs/day: 2.00     Average packs/day: 2.0 packs/day for 32.9 years (65.8 ttl pk-yrs)     Types: Cigarettes     Start date: 1992    Smokeless tobacco: Never   Substance and Sexual Activity    Alcohol use: Yes     Alcohol/week: 84.0 standard drinks of alcohol     Types: 84 Cans of beer per week     Comment: beer 12 pack daily    Drug use: No    Sexual activity: Yes     Partners: Female       Current Outpatient Medications on File Prior to Visit   Medication Sig Dispense Refill    amoxicillin-clavulanate 875-125mg (AUGMENTIN) 875-125 mg per tablet Take 1 tablet by mouth every 12 (twelve) hours. 14 tablet 0    aspirin-caffeine 845-65 mg PwPk Take 1-2 packets by mouth daily as needed.      cyclobenzaprine (FLEXERIL) 5 MG tablet Take 5 mg by mouth as needed.      fluticasone propionate (FLONASE) 50 mcg/actuation nasal spray 1 spray (50 mcg total) by Each Nostril route once daily. 16 g 5    gabapentin (NEURONTIN) 300 MG capsule Take by mouth.      tiZANidine (ZANAFLEX) 4 MG tablet SMARTSI Tablet(s) By Mouth Every Evening PRN       No current facility-administered medications on file prior to visit.       Review of patient's allergies indicates:  No Known Allergies    General -  "Well developed, alert and oriented in NAD  HEENT - normocephalic, no evidence of trauma, sclera white, EOMI  COR - regular rate and rhythm without murmurs or gallops  Lungs - Clear  Abdomen - soft, non-tender  Ext - no cyanosis or edema     Assessment:     1. Cervical spondylosis with radiculopathy    2. Preoperative examination    3. Other long term (current) drug therapy        Pertinent Labs:    Chemistry        Component Value Date/Time     11/13/2024 1517    K 4.5 11/13/2024 1517     11/13/2024 1517    CO2 27 11/13/2024 1517    BUN 11 11/13/2024 1517    CREATININE 0.8 11/13/2024 1517    GLU 90 11/13/2024 1517        Component Value Date/Time    CALCIUM 10.3 11/13/2024 1517    ALKPHOS 63 09/08/2023 0858    AST 52 (H) 09/08/2023 0858    ALT 53 (H) 09/08/2023 0858    BILITOT 0.3 09/08/2023 0858    ESTGFRAFRICA >60.0 09/13/2017 0808    EGFRNONAA >60.0 09/13/2017 0808          Lab Results   Component Value Date    WBC 12.46 11/13/2024    HGB 14.5 11/13/2024    HCT 45.5 11/13/2024     (H) 11/13/2024    MCH 31.9 (H) 11/13/2024    MCHC 31.9 (L) 11/13/2024    RDW 13.7 11/13/2024     (H) 11/13/2024    MPV 10.4 11/13/2024       Lab Results   Component Value Date    GLU 90 11/13/2024     Lab Results   Component Value Date    LDLCALC 100.6 09/08/2023     Lab Results   Component Value Date    TSH 0.527 09/08/2023     Lab Results   Component Value Date    CHOL 200 (H) 09/08/2023    CHOL 203 (H) 09/13/2017     Lab Results   Component Value Date    TRIG 32 09/08/2023    TRIG 107 09/13/2017     Lab Results   Component Value Date    HDL 93 (H) 09/08/2023    HDL 45 09/13/2017     Lab Results   Component Value Date    LDLCALC 100.6 09/08/2023    LDLCALC 136.6 09/13/2017     No results found for: "NONHDLC"  Lab Results   Component Value Date    CHOLHDL 46.5 09/08/2023    CHOLHDL 22.2 09/13/2017       The 10-year ASCVD risk score (Zeke GALAVIZ, et al., 2019) is: 3%    Values used to calculate the score:      " "Age: 44 years      Sex: Male      Is Non- : No      Diabetic: No      Tobacco smoker: Yes      Systolic Blood Pressure: 148 mmHg      Is BP treated: No      HDL Cholesterol: 93 mg/dL      Total Cholesterol: 200 mg/dL    Plan:   Pravin Morgan" was seen today for pre-op exam.    Diagnoses and all orders for this visit:    Cervical spondylosis with radiculopathy  -     CBC Auto Differential; Future  -     BASIC METABOLIC PANEL; Future  -     Protime-INR; Future  -     APTT; Future  -     Sedimentation rate; Future  -     Urinalysis, Reflex to Urine Culture Urine, Clean Catch    Preoperative examination  -     CBC Auto Differential; Future  -     BASIC METABOLIC PANEL; Future  -     Protime-INR; Future  -     APTT; Future  -     Sedimentation rate; Future  -     Urinalysis, Reflex to Urine Culture Urine, Clean Catch    Other long term (current) drug therapy  -     CBC Auto Differential; Future  -     BASIC METABOLIC PANEL; Future  -     Protime-INR; Future  -     APTT; Future  -     Sedimentation rate; Future  -     Urinalysis, Reflex to Urine Culture Urine, Clean Catch        Assessment & Plan    - Reviewed patient's previous paperwork and determined patient is suitable for surgery.  - Considered need for updated labs due to approaching expiration date of previous tests.  - Evaluated necessity of additional diagnostic tests; decided against repeating chest XR and EKG.  - Assessed pulmonology clearance based on faxed letter from pulmonologist.  - Ordered labs and urine sample.  - Follow up for labs and urine sample collection.         1. Cervical spondylosis with radiculopathy  - CBC Auto Differential; Future  - BASIC METABOLIC PANEL; Future  - Protime-INR; Future  - APTT; Future  - Sedimentation rate; Future  - Urinalysis, Reflex to Urine Culture Urine, Clean Catch    2. Preoperative examination  - CBC Auto Differential; Future  - BASIC METABOLIC PANEL; Future  - Protime-INR; Future  - APTT; " Future  - Sedimentation rate; Future  - Urinalysis, Reflex to Urine Culture Urine, Clean Catch    3. Other long term (current) drug therapy  - CBC Auto Differential; Future  - BASIC METABOLIC PANEL; Future  - Protime-INR; Future  - APTT; Future  - Sedimentation rate; Future  - Urinalysis, Reflex to Urine Culture Urine, Clean Catch    Revised Patel cardiac risk index (RCRI)    Rate of cardiac death, nonfatal myocardial infarction, and  nonfatal cardiac arrest according to the number of predictors  No risk factors - 0.4 percent (95% CI 0.1-0.8 percent)    Rate of cardiac death & nonfatal MI, cardiac arrest or ventr.  fibrillation, pulmonary edema, and/or complete heart block  according to the No.of predictors and use nonuse or of beta  blockers  No risk factors - 0.4 to 1.0 percent versus <1 percent with beta blockers      Chest x-ray showed cavitating mass in right upper lung lobe.  Patient followed up with pulmonology.  Lung opacity biopsied, showed inflammatory changes and less likely malignancy.  Pulmonology clearance obtained. It is okay to proceed with surgery PROVIDED RISK IS ACCEPTABLE TO BOTH THE PATIENT AND THE SURGEON PERFORMING THE SURGERY.         Immunization History   Administered Date(s) Administered    Tdap 05/04/2018       Orders Placed This Encounter   Procedures    CBC Auto Differential    BASIC METABOLIC PANEL    Protime-INR    APTT    Sedimentation rate    Urinalysis, Reflex to Urine Culture Urine, Clean Catch       Portions of this note were generated by [employer].    Each patient to whom medical services by telemedicine are provided:  (1) informed of the relationship between the physician and patient and the respective role of any other health care provider with respect to management of the patient; and (2) notified that he or she may decline to receive medical services by telemedicine and may withdraw from such care at any time.    I spent a total of 20 minutes face to face and non-face to  face on the date of this visit.This includes time preparing to see the patient (eg, review of tests, notes), obtaining and/or reviewing additional history from an independent historian and/or outside medical records, documenting clinical information in the electronic health record, independently interpreting results and/or communicating results to the patient/family/caregiver, or care coordinator.  Visit today included increased complexity associated with the care of the episodic problem addressed and managing the longitudinal care of the patient due to the serious and/or complex managed problem(s).      This note was generated with the assistance of ambient listening technology. Verbal consent was obtained by the patient and accompanying visitor(s) for the recording of patient appointment to facilitate this note. I attest to having reviewed and edited the generated note for accuracy, though some syntax or spelling errors may persist. Please contact the author of this note for any clarification.      Cecilio Iniguez MD

## 2024-12-18 LAB
APTT PPP: 34.6 SEC (ref 21–32)
INR PPP: 1 (ref 0.8–1.2)
PROTHROMBIN TIME: 10.7 SEC (ref 9–12.5)

## 2025-02-17 ENCOUNTER — PATIENT MESSAGE (OUTPATIENT)
Dept: PULMONOLOGY | Facility: CLINIC | Age: 45
End: 2025-02-17
Payer: COMMERCIAL

## 2025-04-23 ENCOUNTER — RESULTS FOLLOW-UP (OUTPATIENT)
Dept: INTERNAL MEDICINE | Facility: CLINIC | Age: 45
End: 2025-04-23

## 2025-04-23 ENCOUNTER — HOSPITAL ENCOUNTER (OUTPATIENT)
Dept: RADIOLOGY | Facility: HOSPITAL | Age: 45
Discharge: HOME | End: 2025-04-23
Attending: FAMILY MEDICINE
Payer: COMMERCIAL

## 2025-04-23 ENCOUNTER — OFFICE VISIT (OUTPATIENT)
Dept: INTERNAL MEDICINE | Facility: CLINIC | Age: 45
End: 2025-04-23
Payer: COMMERCIAL

## 2025-04-23 VITALS
TEMPERATURE: 98 F | BODY MASS INDEX: 29.48 KG/M2 | WEIGHT: 199.06 LBS | DIASTOLIC BLOOD PRESSURE: 103 MMHG | SYSTOLIC BLOOD PRESSURE: 152 MMHG | OXYGEN SATURATION: 98 % | HEIGHT: 69 IN

## 2025-04-23 DIAGNOSIS — Z00.00 ROUTINE ADULT HEALTH MAINTENANCE: ICD-10-CM

## 2025-04-23 DIAGNOSIS — R06.02 SOB (SHORTNESS OF BREATH): Primary | ICD-10-CM

## 2025-04-23 DIAGNOSIS — R63.5 WEIGHT GAIN: ICD-10-CM

## 2025-04-23 DIAGNOSIS — J98.4 CAVITATING MASS IN RIGHT UPPER LUNG LOBE: ICD-10-CM

## 2025-04-23 DIAGNOSIS — R06.02 SOB (SHORTNESS OF BREATH): ICD-10-CM

## 2025-04-23 PROCEDURE — 71046 X-RAY EXAM CHEST 2 VIEWS: CPT | Mod: TC,PO

## 2025-04-23 PROCEDURE — 71046 X-RAY EXAM CHEST 2 VIEWS: CPT | Mod: 26,,, | Performed by: STUDENT IN AN ORGANIZED HEALTH CARE EDUCATION/TRAINING PROGRAM

## 2025-04-23 PROCEDURE — 99999 PR PBB SHADOW E&M-EST. PATIENT-LVL IV: CPT | Mod: PBBFAC,,, | Performed by: FAMILY MEDICINE

## 2025-04-23 RX ORDER — LISINOPRIL 10 MG/1
10 TABLET ORAL DAILY
Qty: 30 TABLET | Refills: 3 | Status: SHIPPED | OUTPATIENT
Start: 2025-04-23 | End: 2026-04-23

## 2025-04-23 RX ORDER — ZONISAMIDE 100 MG/1
100 CAPSULE ORAL DAILY
COMMUNITY
Start: 2025-04-23 | End: 2025-10-20

## 2025-04-23 NOTE — PROGRESS NOTES
Subjective:      Patient ID: Pravin Padilla is a 45 y.o. male.    Chief Complaint: Hypertension and Shortness of Breath      History of Present Illness    CHIEF COMPLAINT:  Mr. Padilla presents today for follow up of lung issues.  He was seen for right cavitary mass last year by the pulmonary department, missed follow-up as it was just after his cervical fusion surgery  He also notes recent weight gain-reports about 40 lb in the past year, no change in activity level or diet.    RESPIRATORY:  He reports recent onset of shortness of breath with movement, describing feeling constricted and unable to take a full breath. He also notes a new cough with associated posterior head pain that started a few days ago. Biopsy shows no evidence of cancer.    CARDIOVASCULAR:  He reports elevated blood pressure reading of 170/140 with no prior history of hypertension. He notes bilateral leg swelling unresponsive to OTC diuretics.    RECENT SURGERY AND WEIGHT CHANGES:  He underwent cervical fusion surgery on New Year's Radha. Since then, he has gained 40 lbs over 2.5 months. He reports decreased appetite over the past 4-5 days with significantly reduced food intake, consuming only half a cheeseburger for an entire day.    MEDICATIONS:  He was recently transitioned from gabapentin to zonisamide. He takes Flexeril intermittently (approximately once weekly) and BC powder every morning for carpal tunnel symptoms.    SOCIAL HISTORY:  He currently consumes 12 beers nightly, which represents a reduction from previous intake. He smokes 1 pack per day, decreased from 2 packs previously.        Past Medical History:   Diagnosis Date    Alcohol abuse           Past Surgical History:   Procedure Laterality Date    BRONCHOSCOPY N/A 12/5/2024    Procedure: BRONCHOSCOPY;  Surgeon: Kathy Hurd MD;  Location: [facility];  Service: Pulmonary;  Laterality: N/A;    CARPAL TUNNEL RELEASE Right     ENDOBRONCHIAL ULTRASOUND  12/5/2024     "Procedure: ENDOBRONCHIAL ULTRASOUND (EBUS);  Surgeon: Kathy Hurd MD;  Location: [facility] ENDO;  Service: Pulmonary;;    knee sx      mensicus repair    ROBOTIC BRONCHOSCOPY Bilateral 12/5/2024    Procedure: ROBOTIC BRONCHOSCOPY;  Surgeon: Kathy Hurd MD;  Location: [facility] ENDO;  Service: Pulmonary;  Laterality: Bilateral;    VASECTOMY       Family History   Problem Relation Name Age of Onset    Diabetes Mother      Prostate cancer Maternal Grandfather       Social History[1]  Review of patient's allergies indicates:  No Known Allergies    Review of Systems   Constitutional:  Positive for malaise/fatigue. Negative for weight loss (gain).   Respiratory:  Positive for cough and shortness of breath.    Cardiovascular:  Negative for chest pain.     Objective:       BP (!) 152/103 (BP Location: Left arm, Patient Position: Sitting)   Temp 98.1 °F (36.7 °C) (Tympanic)   Ht 5' 9" (1.753 m)   Wt 90.3 kg (199 lb 1.2 oz)   SpO2 98%   BMI 29.40 kg/m²   Physical Exam             Physical Exam  Constitutional:       General: He is not in acute distress.     Appearance: Normal appearance. He is well-developed. He is not ill-appearing or diaphoretic.   Cardiovascular:      Rate and Rhythm: Normal rate and regular rhythm.      Heart sounds: Normal heart sounds.   Pulmonary:      Effort: Pulmonary effort is normal. No respiratory distress.      Breath sounds: Normal breath sounds.   Abdominal:      General: There is no distension.      Palpations: Abdomen is soft.      Tenderness: There is no abdominal tenderness.   Musculoskeletal:      Right lower leg: No edema.      Left lower leg: No edema.   Neurological:      General: No focal deficit present.      Mental Status: He is alert and oriented to person, place, and time.   Psychiatric:         Mood and Affect: Mood normal.         Behavior: Behavior normal.         Thought Content: Thought content normal.         Judgment: Judgment normal.         Assessment:     1. " SOB (shortness of breath)    2. Cavitating mass in right upper lung lobe    3. Weight gain    4. Routine adult health maintenance      Plan:   Assessment & Plan    MEDICAL DECISION MAKING:  - Evaluated recent weight gain of 40 lbs in 2.5 months, potentially related to gabapentin use and post-op inactivity.  - Considered possible fluid retention and its impact on shortness of breath.  - Assessed recent elevation in BP (199 today, 181 in December) compared to previously normal readings.  - Evaluated liver function based on past mildly elevated results.  - Considered potential heart failure as cause for weight gain and shortness of breath.  - Assessed cough and associated head pain, likely related to blood flow changes during coughing.    ORDERS:  - Ordered XR Chest.  - Ordered lab work including BNP and liver function tests.    FOLLOW UP:  - Follow up after XR Chest for further discussion.        SOB (shortness of breath)  -     X-Ray Chest PA And Lateral; Future; Expected date: 04/23/2025  -     BNP; Future; Expected date: 04/23/2025    Cavitating mass in right upper lung lobe  -     X-Ray Chest PA And Lateral; Future; Expected date: 04/23/2025    Weight gain  -     Hemoglobin A1C; Future; Expected date: 04/23/2025  -     TSH; Future; Expected date: 04/23/2025  -     T4, Free; Future; Expected date: 07/23/2025  -     BNP; Future; Expected date: 04/23/2025    Routine adult health maintenance  -     CBC Auto Differential; Future; Expected date: 04/23/2025  -     Comprehensive Metabolic Panel; Future; Expected date: 04/23/2025  -     Lipid Panel; Future; Expected date: 04/23/2025  -     Hemoglobin A1C; Future; Expected date: 04/23/2025  -     TSH; Future; Expected date: 04/23/2025  -     T4, Free; Future; Expected date: 07/23/2025    Other orders  -     lisinopriL 10 MG tablet; Take 1 tablet (10 mg total) by mouth once daily.  Dispense: 30 tablet; Refill: 3      Medication List with Changes/Refills   New Medications     LISINOPRIL 10 MG TABLET    Take 1 tablet (10 mg total) by mouth once daily.   Current Medications    ASPIRIN-CAFFEINE 845-65 MG PWPK    Take 1-2 packets by mouth daily as needed.    CYCLOBENZAPRINE (FLEXERIL) 5 MG TABLET    Take 5 mg by mouth as needed.    FLUTICASONE PROPIONATE (FLONASE) 50 MCG/ACTUATION NASAL SPRAY    1 spray (50 mcg total) by Each Nostril route once daily.    TIZANIDINE (ZANAFLEX) 4 MG TABLET    SMARTSI Tablet(s) By Mouth Every Evening PRN    ZONISAMIDE (ZONEGRAN) 100 MG CAP    Take 100 mg by mouth once daily.   Discontinued Medications    AMOXICILLIN-CLAVULANATE 875-125MG (AUGMENTIN) 875-125 MG PER TABLET    Take 1 tablet by mouth every 12 (twelve) hours.    GABAPENTIN (NEURONTIN) 300 MG CAPSULE    Take by mouth.       This note was generated with the assistance of ambient listening technology. Verbal consent was obtained by the patient and accompanying visitor(s) for the recording of patient appointment to facilitate this note. I attest to having reviewed and edited the generated note for accuracy, though some syntax or spelling errors may persist. Please contact the author of this note for any clarification.            [1]   Social History  Socioeconomic History    Marital status:    Tobacco Use    Smoking status: Every Day     Current packs/day: 2.00     Average packs/day: 2.0 packs/day for 33.2 years (66.4 ttl pk-yrs)     Types: Cigarettes     Start date: 1992    Smokeless tobacco: Never   Substance and Sexual Activity    Alcohol use: Yes     Alcohol/week: 84.0 standard drinks of alcohol     Types: 84 Cans of beer per week     Comment: beer 12 pack daily    Drug use: No    Sexual activity: Yes     Partners: Female

## 2025-05-05 ENCOUNTER — OFFICE VISIT (OUTPATIENT)
Dept: PULMONOLOGY | Facility: CLINIC | Age: 45
End: 2025-05-05
Payer: COMMERCIAL

## 2025-05-05 VITALS
HEART RATE: 105 BPM | RESPIRATION RATE: 20 BRPM | DIASTOLIC BLOOD PRESSURE: 98 MMHG | OXYGEN SATURATION: 97 % | HEIGHT: 69 IN | WEIGHT: 197.88 LBS | SYSTOLIC BLOOD PRESSURE: 144 MMHG | BODY MASS INDEX: 29.31 KG/M2

## 2025-05-05 DIAGNOSIS — J44.9 COPD SUGGESTED BY INITIAL EVALUATION: Primary | ICD-10-CM

## 2025-05-05 DIAGNOSIS — R59.0 MEDIASTINAL LYMPHADENOPATHY: ICD-10-CM

## 2025-05-05 DIAGNOSIS — I10 UNCONTROLLED HYPERTENSION: ICD-10-CM

## 2025-05-05 DIAGNOSIS — F17.210 SMOKING GREATER THAN 30 PACK YEARS: ICD-10-CM

## 2025-05-05 DIAGNOSIS — J98.4 CAVITATING MASS IN RIGHT UPPER LUNG LOBE: ICD-10-CM

## 2025-05-05 DIAGNOSIS — R59.0 HILAR LYMPHADENOPATHY: ICD-10-CM

## 2025-05-05 PROCEDURE — 99999 PR PBB SHADOW E&M-EST. PATIENT-LVL V: CPT | Mod: PBBFAC,,, | Performed by: INTERNAL MEDICINE

## 2025-05-05 PROCEDURE — 4010F ACE/ARB THERAPY RXD/TAKEN: CPT | Mod: CPTII,S$GLB,, | Performed by: INTERNAL MEDICINE

## 2025-05-05 PROCEDURE — 1159F MED LIST DOCD IN RCRD: CPT | Mod: CPTII,S$GLB,, | Performed by: INTERNAL MEDICINE

## 2025-05-05 PROCEDURE — 99214 OFFICE O/P EST MOD 30 MIN: CPT | Mod: S$GLB,,, | Performed by: INTERNAL MEDICINE

## 2025-05-05 PROCEDURE — 1160F RVW MEDS BY RX/DR IN RCRD: CPT | Mod: CPTII,S$GLB,, | Performed by: INTERNAL MEDICINE

## 2025-05-05 PROCEDURE — 3044F HG A1C LEVEL LT 7.0%: CPT | Mod: CPTII,S$GLB,, | Performed by: INTERNAL MEDICINE

## 2025-05-05 PROCEDURE — 3079F DIAST BP 80-89 MM HG: CPT | Mod: CPTII,S$GLB,, | Performed by: INTERNAL MEDICINE

## 2025-05-05 PROCEDURE — 3008F BODY MASS INDEX DOCD: CPT | Mod: CPTII,S$GLB,, | Performed by: INTERNAL MEDICINE

## 2025-05-05 PROCEDURE — 3077F SYST BP >= 140 MM HG: CPT | Mod: CPTII,S$GLB,, | Performed by: INTERNAL MEDICINE

## 2025-05-05 RX ORDER — ALBUTEROL SULFATE 90 UG/1
2 POWDER, METERED RESPIRATORY (INHALATION) EVERY 4 HOURS PRN
Qty: 1 EACH | Refills: 5 | Status: SHIPPED | OUTPATIENT
Start: 2025-05-05

## 2025-05-05 RX ORDER — FLUTICASONE FUROATE, UMECLIDINIUM BROMIDE AND VILANTEROL TRIFENATATE 100; 62.5; 25 UG/1; UG/1; UG/1
1 POWDER RESPIRATORY (INHALATION) DAILY
Qty: 60 EACH | Refills: 5 | Status: SHIPPED | OUTPATIENT
Start: 2025-05-05

## 2025-05-05 NOTE — PROGRESS NOTES
"                                         Pulmonary Outpatient Follow Up Visit     Subjective:       Patient ID: Pravin Padilla is a 45 y.o. male.    Chief Complaint: Mass and Shortness of Breath        History of Present Illness    CHIEF COMPLAINT:  Patient presents today with shortness of breath    HISTORY OF PRESENT ILLNESS:  He developed severe shortness of breath with associated vision changes approximately 4 days after initiating blood pressure medication. He discontinued the medication independently, which resulted in improvement of breathing symptoms. He recently saw PCP for evaluation.    SOCIAL HISTORY:  He currently smokes over one pack per day with a 35-40 year smoking history. He has a history of alcoholism.    MEDICAL HISTORY:  He denies any hospitalizations since last procedure.                   Review of Systems   Constitutional:  Negative for fever.   HENT:  Negative for nosebleeds.    Respiratory:  Positive for cough, shortness of breath, dyspnea on extertion and use of rescue inhaler.    Psychiatric/Behavioral:  Negative for confusion.        Encounter Medications[1]    Objective:     Vital Signs (Most Recent)  Vital Signs  Pulse: 105  Resp: 20  SpO2: 97 %  BP: (!) 144/98  BP Location: Right arm  Patient Position: Sitting  Pain Score: 0-No pain  Height and Weight  Height: 5' 9" (175.3 cm)  Weight: 89.8 kg (197 lb 13.8 oz)  BSA (Calculated - sq m): 2.09 sq meters  BMI (Calculated): 29.2  Weight in (lb) to have BMI = 25: 168.9]  Wt Readings from Last 2 Encounters:   05/05/25 89.8 kg (197 lb 13.8 oz)   04/23/25 90.3 kg (199 lb 1.2 oz)       Physical Exam   Constitutional: He is oriented to person, place, and time. He appears well-developed. No distress.   HENT:   Head: Normocephalic.   Pulmonary/Chest: Normal expansion and effort normal. No stridor. No respiratory distress. He has decreased breath sounds. He has no wheezes. He has no rhonchi.   Neurological: He is alert and oriented to person, " "place, and time.   Psychiatric: He has a normal mood and affect. His behavior is normal. Judgment and thought content normal.   Nursing note and vitals reviewed.      Laboratory  Lab Results   Component Value Date    WBC 9.28 04/23/2025    RBC 4.86 04/23/2025    HGB 15.4 04/23/2025    HCT 47.1 04/23/2025    MCV 97 04/23/2025    MCH 31.7 (H) 04/23/2025    MCHC 32.7 04/23/2025    RDW 13.7 04/23/2025     04/23/2025    MPV 11.5 04/23/2025    GRAN 3.8 12/17/2024    GRAN 52.6 12/17/2024    LYMPH 24.4 04/23/2025    LYMPH 2.26 04/23/2025    MONO 8.9 04/23/2025    MONO 0.83 04/23/2025    EOS 3.1 04/23/2025    EOS 0.29 04/23/2025    BASO 0.07 12/17/2024    EOSINOPHIL 5.8 12/17/2024    BASOPHIL 1.0 04/23/2025    BASOPHIL 0.09 04/23/2025       BMP  Lab Results   Component Value Date     04/23/2025    K 3.9 04/23/2025     04/23/2025    CO2 23 04/23/2025    BUN 16 04/23/2025    CREATININE 0.9 04/23/2025    CALCIUM 9.7 04/23/2025    ANIONGAP 12 04/23/2025    ESTGFRAFRICA >60.0 09/13/2017    EGFRNONAA >60.0 09/13/2017    AST 49 (H) 04/23/2025    ALT 46 (H) 04/23/2025    PROT 7.8 04/23/2025       Lab Results   Component Value Date    BNP <10 04/23/2025       Lab Results   Component Value Date    TSH 0.683 04/23/2025       Lab Results   Component Value Date    SEDRATE 9 12/17/2024       Lab Results   Component Value Date    .4 (H) 11/20/2024     No results found for: "IGE"     No results found for: "ASPERGILLUS"  No results found for: "AFUMIGATUSCL"     No results found for: "ACE"     Diagnostic Results:  I have personally reviewed today the following studies:  As above    Assessment/Plan:   COPD suggested by initial evaluation  -     fluticasone-umeclidin-vilanter (TRELEGY ELLIPTA) 100-62.5-25 mcg DsDv; Inhale 1 puff into the lungs once daily.  Dispense: 60 each; Refill: 5  -     albuterol sulfate (PROAIR RESPICLICK) 90 mcg/actuation inhaler; Inhale 2 puffs into the lungs every 4 (four) hours as needed for " Wheezing. Rescue  Dispense: 1 each; Refill: 5  -     Ambulatory referral/consult to Pulmonary Disease Management w/ Respiratory Therapist; Future; Expected date: 05/12/2025    Smoking greater than 30 pack years    Cavitating mass in right upper lung lobe    Mediastinal lymphadenopathy    Hilar lymphadenopathy    Uncontrolled hypertension      Assessment & Plan    J44.9 COPD suggested by initial evaluation  F17.210 Smoking greater than 30 pack years  J98.4 Cavitating mass in right upper lung lobe  R59.0 Mediastinal lymphadenopathy    IMPRESSION:  - Noted resolution of haziness in right upper lung on XR compared to previous imaging.  - Recommend CT Chest to rule out hidden tumors given heavy smoking history.  - Not in active COPD or bronchitis flare-up, but likely has low lung function reserve.  - Requires daily inhaler therapy to address reported shortness of breath.  - Considered potential side effects of BP medication, noting shortness of breath is not a typical side effect.    COPD SUGGESTED BY INITIAL EVALUATION:  - Explained importance of rinsing mouth after using steroid inhaler to prevent yeast infection and voice changes.  - Discussed purpose of breathing tests to assess lung capacity.  - Explained prescribed inhaler contains 3 medications combined in one.  - Started Trelegy (fluticasone/umeclidinium/vilanterol) inhaler: Use daily, same time each day, regardless of symptoms.  - Started rescue inhaler: Use as needed every 4-6 hours for coughing, wheezing, or shortness of breath.  - Ordered pulmonary function test.  - Follow up in 3 months to review test results and reassess condition.  - Contact the office if anything needs to be addressed earlier.    CAVITATING MASS IN RIGHT UPPER LUNG LOBE:  - Ordered CT Chest WO Contrast.    LIFESTYLE CHANGES:  - Patient to quit smoking.       Schedule previously ordered PFT 6 minute walk and CT scan of the chest without contrast for surveillance of right upper lobe  mass    Declined Chantix and nicotine patches.  Follow up in about 3 months (around 2025).    This note was prepared using voice recognition system and is likely to have sound alike errors that may have been overlooked even after proof reading.  Please call me with any questions    Discussed diagnosis, its evaluation, treatment and usual course. All questions answered.      Kathy Hurd MD         [1]   Outpatient Encounter Medications as of 2025   Medication Sig Dispense Refill    aspirin-caffeine 845-65 mg PwPk Take 1-2 packets by mouth daily as needed.      cyclobenzaprine (FLEXERIL) 5 MG tablet Take 5 mg by mouth as needed.      fluticasone propionate (FLONASE) 50 mcg/actuation nasal spray 1 spray (50 mcg total) by Each Nostril route once daily. 16 g 5    lisinopriL 10 MG tablet Take 1 tablet (10 mg total) by mouth once daily. 30 tablet 3    tiZANidine (ZANAFLEX) 4 MG tablet SMARTSI Tablet(s) By Mouth Every Evening PRN      zonisamide (ZONEGRAN) 100 MG Cap Take 100 mg by mouth once daily.      albuterol sulfate (PROAIR RESPICLICK) 90 mcg/actuation inhaler Inhale 2 puffs into the lungs every 4 (four) hours as needed for Wheezing. Rescue 1 each 5    fluticasone-umeclidin-vilanter (TRELEGY ELLIPTA) 100-62.5-25 mcg DsDv Inhale 1 puff into the lungs once daily. 60 each 5     No facility-administered encounter medications on file as of 2025.

## 2025-05-12 ENCOUNTER — TELEPHONE (OUTPATIENT)
Dept: PULMONOLOGY | Facility: CLINIC | Age: 45
End: 2025-05-12
Payer: COMMERCIAL

## 2025-05-12 NOTE — TELEPHONE ENCOUNTER
Chronic Disease Management:   Called patient to confirm Pulmonary Disease Management appointment. Left voicemail.

## 2025-05-13 ENCOUNTER — CLINICAL SUPPORT (OUTPATIENT)
Dept: PULMONOLOGY | Facility: CLINIC | Age: 45
End: 2025-05-13
Payer: COMMERCIAL

## 2025-05-13 ENCOUNTER — TELEPHONE (OUTPATIENT)
Dept: CARDIOLOGY | Facility: CLINIC | Age: 45
End: 2025-05-13
Payer: COMMERCIAL

## 2025-05-13 ENCOUNTER — OFFICE VISIT (OUTPATIENT)
Dept: INTERNAL MEDICINE | Facility: CLINIC | Age: 45
End: 2025-05-13
Payer: COMMERCIAL

## 2025-05-13 ENCOUNTER — LAB VISIT (OUTPATIENT)
Dept: LAB | Facility: HOSPITAL | Age: 45
End: 2025-05-13
Attending: FAMILY MEDICINE
Payer: COMMERCIAL

## 2025-05-13 VITALS
DIASTOLIC BLOOD PRESSURE: 87 MMHG | BODY MASS INDEX: 28.6 KG/M2 | SYSTOLIC BLOOD PRESSURE: 174 MMHG | WEIGHT: 193.13 LBS | OXYGEN SATURATION: 99 % | HEART RATE: 106 BPM | TEMPERATURE: 99 F | HEIGHT: 69 IN

## 2025-05-13 VITALS — HEIGHT: 69 IN | BODY MASS INDEX: 28.6 KG/M2 | WEIGHT: 193.13 LBS

## 2025-05-13 DIAGNOSIS — J98.4 CAVITATING MASS IN RIGHT UPPER LUNG LOBE: ICD-10-CM

## 2025-05-13 DIAGNOSIS — R00.2 PALPITATIONS: ICD-10-CM

## 2025-05-13 DIAGNOSIS — J44.9 COPD SUGGESTED BY INITIAL EVALUATION: ICD-10-CM

## 2025-05-13 DIAGNOSIS — R06.02 SOB (SHORTNESS OF BREATH): ICD-10-CM

## 2025-05-13 DIAGNOSIS — I10 ESSENTIAL HYPERTENSION: ICD-10-CM

## 2025-05-13 DIAGNOSIS — I10 ESSENTIAL HYPERTENSION: Primary | ICD-10-CM

## 2025-05-13 LAB
ALBUMIN SERPL BCP-MCNC: 3.9 G/DL (ref 3.5–5.2)
ALP SERPL-CCNC: 71 UNIT/L (ref 40–150)
ALT SERPL W/O P-5'-P-CCNC: 93 UNIT/L (ref 10–44)
ANION GAP (OHS): 10 MMOL/L (ref 8–16)
AST SERPL-CCNC: 80 UNIT/L (ref 11–45)
BILIRUB SERPL-MCNC: 0.5 MG/DL (ref 0.1–1)
BRPFT: ABNORMAL
BUN SERPL-MCNC: 15 MG/DL (ref 6–20)
CALCIUM SERPL-MCNC: 9.6 MG/DL (ref 8.7–10.5)
CHLORIDE SERPL-SCNC: 101 MMOL/L (ref 95–110)
CO2 SERPL-SCNC: 26 MMOL/L (ref 23–29)
CREAT SERPL-MCNC: 1 MG/DL (ref 0.5–1.4)
DLCO ADJ PRE: 15.38 ML/(MIN*MMHG) (ref 24.25–38.11)
DLCO SINGLE BREATH LLN: 24.25
DLCO SINGLE BREATH PRE REF: 50.4 %
DLCO SINGLE BREATH REF: 31.18
DLCOC SBVA LLN: 3.26
DLCOC SBVA PRE REF: 65.6 %
DLCOC SBVA REF: 4.52
DLCOC SINGLE BREATH LLN: 24.25
DLCOC SINGLE BREATH PRE REF: 49.3 %
DLCOC SINGLE BREATH REF: 31.18
DLCOVA LLN: 3.26
DLCOVA PRE REF: 67 %
DLCOVA PRE: 3.03 ML/(MIN*MMHG*L) (ref 3.26–5.78)
DLCOVA REF: 4.52
DLVAADJ PRE: 2.96 ML/(MIN*MMHG*L) (ref 3.26–5.78)
ERV LLN: -16448.64
ERV PRE REF: 94.3 %
ERV REF: 1.36
FEF 25 75 LLN: 2.45
FEF 25 75 PRE REF: 89 %
FEF 25 75 REF: 4.16
FEV1 FVC LLN: 69
FEV1 FVC PRE REF: 104.9 %
FEV1 FVC REF: 80
FEV1 LLN: 3.1
FEV1 PRE REF: 85.7 %
FEV1 REF: 3.93
FRCPLETH LLN: 2.42
FRCPLETH PREREF: 81 %
FRCPLETH REF: 3.41
FVC LLN: 3.89
FVC PRE REF: 81.4 %
FVC REF: 4.93
GFR SERPLBLD CREATININE-BSD FMLA CKD-EPI: >60 ML/MIN/1.73/M2
GLUCOSE SERPL-MCNC: 101 MG/DL (ref 70–110)
IVC PRE: 3.63 L (ref 3.89–5.97)
IVC SINGLE BREATH LLN: 3.89
IVC SINGLE BREATH PRE REF: 73.5 %
IVC SINGLE BREATH REF: 4.93
MVV LLN: 127
MVV PRE REF: 77.7 %
MVV REF: 149
PEF LLN: 7.54
PEF PRE REF: 87.2 %
PEF REF: 9.78
POTASSIUM SERPL-SCNC: 4.2 MMOL/L (ref 3.5–5.1)
PRE DLCO: 15.71 ML/(MIN*MMHG) (ref 24.25–38.11)
PRE ERV: 1.28 L (ref -16448.64–16451.36)
PRE FEF 25 75: 3.7 L/S (ref 2.45–5.87)
PRE FET 100: 3.21 SEC
PRE FEV1 FVC: 83.94 % (ref 69.43–90.65)
PRE FEV1: 3.37 L (ref 3.1–4.77)
PRE FRC PL: 2.76 L
PRE FVC: 4.02 L (ref 3.89–5.97)
PRE MVV: 116 L/MIN (ref 126.82–171.58)
PRE PEF: 8.54 L/S (ref 7.54–12.03)
PRE RV: 1.35 L (ref 1.38–2.73)
PRE TLC: 5.36 L (ref 5.75–8.05)
PROT SERPL-MCNC: 7.5 GM/DL (ref 6–8.4)
RAW LLN: 3.06
RAW PRE REF: 117.2 %
RAW PRE: 3.59 CMH2O*S/L (ref 3.06–3.06)
RAW REF: 3.06
RV LLN: 1.38
RV PRE REF: 65.6 %
RV REF: 2.05
RVTLC LLN: 23
RVTLC PRE REF: 79.7 %
RVTLC PRE: 25.12 % (ref 22.53–40.49)
RVTLC REF: 32
SODIUM SERPL-SCNC: 137 MMOL/L (ref 136–145)
TLC LLN: 5.75
TLC PRE REF: 77.7 %
TLC REF: 6.9
VA PRE: 5.19 L (ref 6.75–6.75)
VA SINGLE BREATH LLN: 6.75
VA SINGLE BREATH PRE REF: 76.9 %
VA SINGLE BREATH REF: 6.75
VC LLN: 3.89
VC PRE REF: 81.4 %
VC PRE: 4.02 L (ref 3.89–5.97)
VC REF: 4.93
VTGRAWPRE: 3.12 L

## 2025-05-13 PROCEDURE — 1159F MED LIST DOCD IN RCRD: CPT | Mod: CPTII,S$GLB,, | Performed by: FAMILY MEDICINE

## 2025-05-13 PROCEDURE — 94010 BREATHING CAPACITY TEST: CPT | Mod: 59,S$GLB,, | Performed by: INTERNAL MEDICINE

## 2025-05-13 PROCEDURE — 99999 PR PBB SHADOW E&M-EST. PATIENT-LVL II: CPT | Mod: PBBFAC,,,

## 2025-05-13 PROCEDURE — 99214 OFFICE O/P EST MOD 30 MIN: CPT | Mod: S$GLB,,, | Performed by: FAMILY MEDICINE

## 2025-05-13 PROCEDURE — 3077F SYST BP >= 140 MM HG: CPT | Mod: CPTII,S$GLB,, | Performed by: FAMILY MEDICINE

## 2025-05-13 PROCEDURE — 99999 PR PBB SHADOW E&M-EST. PATIENT-LVL IV: CPT | Mod: PBBFAC,,, | Performed by: FAMILY MEDICINE

## 2025-05-13 PROCEDURE — 94726 PLETHYSMOGRAPHY LUNG VOLUMES: CPT | Mod: S$GLB,,, | Performed by: INTERNAL MEDICINE

## 2025-05-13 PROCEDURE — 94618 PULMONARY STRESS TESTING: CPT | Mod: S$GLB,,, | Performed by: INTERNAL MEDICINE

## 2025-05-13 PROCEDURE — 80053 COMPREHEN METABOLIC PANEL: CPT

## 2025-05-13 PROCEDURE — 36415 COLL VENOUS BLD VENIPUNCTURE: CPT

## 2025-05-13 PROCEDURE — 4010F ACE/ARB THERAPY RXD/TAKEN: CPT | Mod: CPTII,S$GLB,, | Performed by: FAMILY MEDICINE

## 2025-05-13 PROCEDURE — 3008F BODY MASS INDEX DOCD: CPT | Mod: CPTII,S$GLB,, | Performed by: FAMILY MEDICINE

## 2025-05-13 PROCEDURE — G2211 COMPLEX E/M VISIT ADD ON: HCPCS | Mod: S$GLB,,, | Performed by: FAMILY MEDICINE

## 2025-05-13 PROCEDURE — 3079F DIAST BP 80-89 MM HG: CPT | Mod: CPTII,S$GLB,, | Performed by: FAMILY MEDICINE

## 2025-05-13 PROCEDURE — 99999 PR PBB SHADOW E&M-EST. PATIENT-LVL I: CPT | Mod: PBBFAC,,,

## 2025-05-13 PROCEDURE — 3044F HG A1C LEVEL LT 7.0%: CPT | Mod: CPTII,S$GLB,, | Performed by: FAMILY MEDICINE

## 2025-05-13 PROCEDURE — 94729 DIFFUSING CAPACITY: CPT | Mod: S$GLB,,, | Performed by: INTERNAL MEDICINE

## 2025-05-13 RX ORDER — LOSARTAN POTASSIUM 25 MG/1
25 TABLET ORAL DAILY
Qty: 30 TABLET | Refills: 3 | Status: SHIPPED | OUTPATIENT
Start: 2025-05-13 | End: 2026-05-13

## 2025-05-13 NOTE — PROCEDURES
"[facility] - Pulmonary Function  Six Minute Walk     SUMMARY     Ordering Provider: Vannessa Goldman   Interpreting Provider: Vannessa Goldman  Performing nurse/tech/RT: LS RRT  Diagnosis:  (MASS in RUL)  Height: 5' 9" (175.3 cm)  Weight: 87.6 kg (193 lb 2 oz)  BMI (Calculated): 28.5                Phase Oxygen Assessment Supplemental O2 Heart   Rate Blood Pressure Nathan Dyspnea Scale Rating   Resting 99 % Room Air 105 bpm 136/77 1   Exercise        Minute        1 94 % Room Air 105 bpm     2 92 % Room Air 105 bpm     3 99 % Room Air 118 bpm     4 98 % Room Air 111 bpm     5 99 % Room Air 115 bpm     6  99 % Room Air 114 bpm 150/84 7-8   Recovery        Minute        1 99 % Room Air 115 bpm     2 100 % Room Air 95 bpm     3 99 % Room Air 96 bpm     4 99 % Room Air 94 bpm 125/82 4     Six Minute Walk Summary  6MWT Status: completed without stopping  Patient Reported: No complaints     Interpretation:  Did the patient stop or pause?: No                                         Total Time Walked (Calculated): 360 seconds  Final Partial Lap Distance (feet): 0 feet  Total Distance Meters (Calculated): 365.76 meters  Predicted Distance Meters (Calculated): 637.95 meters  Percentage of Predicted (Calculated): 57.33  Peak VO2 (Calculated): 14.95  Mets: 4.27  Has The Patient Had a Previous Six Minute Walk Test?: No       Previous 6MWT Results  Has The Patient Had a Previous Six Minute Walk Test?: No    "

## 2025-05-13 NOTE — PROGRESS NOTES
Subjective:      Patient ID: Pravin Padilla is a 45 y.o. male.    Chief Complaint: Follow-up (2 week follow up./Patient stopped taking the blood pressure three days after getting it. )      History of Present Illness    CHIEF COMPLAINT:  Mr. Padilla presents today for follow up of blood pressure concerns    HYPERTENSION:  He reports home blood pressure readings of 178/111-115 throughout the day yesterday. He experienced an adverse reaction to Lisinopril including difficulty breathing, ocular involvement with eye rolling, and severe night sweats requiring ice packs on head and chest for relief. He experiences shortness of breath and chest pounding with minimal exertion, such as getting up to make a drink. During these episodes, he reports feeling like his blood pressure is high and his heart is racing. He denies chest pain.    LABS AND MEDICAL HISTORY:  Recent labs showed elevated cholesterol with LDL increasing by 35 points from previous year, though HDL remains high. Triglycerides and fasting glucose were elevated. Liver function tests were elevated but improved from previous year. He has a history of fatty liver disease previously diagnosed by Dr. Gilliam. He also reports carpal tunnel syndrome with subjective improvement with BC powder use, though acknowledges possible placebo effect.    DIET AND APPETITE:  He reports decreased appetite since starting Zonisamide, now eating only one meal a day, sometimes less than a full meal.    SUBSTANCE USE:  He recently reduced alcohol intake from 12 to 8 beers in one night, though reports feeling similar effects. He went to bed at 7 PM on Sunday due to alcohol intake. He consumes 3-6 Diet Cokes daily and denies coffee or energy drink consumption.        Past Medical History:   Diagnosis Date    Alcohol abuse           Past Surgical History:   Procedure Laterality Date    BRONCHOSCOPY N/A 12/5/2024    Procedure: BRONCHOSCOPY;  Surgeon: Kathy Hurd MD;  Location:  "[facility] ENDO;  Service: Pulmonary;  Laterality: N/A;    CARPAL TUNNEL RELEASE Right     ENDOBRONCHIAL ULTRASOUND  12/5/2024    Procedure: ENDOBRONCHIAL ULTRASOUND (EBUS);  Surgeon: Kathy Hurd MD;  Location: [facility];  Service: Pulmonary;;    knee sx      mensicus repair    ROBOTIC BRONCHOSCOPY Bilateral 12/5/2024    Procedure: ROBOTIC BRONCHOSCOPY;  Surgeon: Kathy Hurd MD;  Location: [facility] ENDO;  Service: Pulmonary;  Laterality: Bilateral;    VASECTOMY       Family History   Problem Relation Name Age of Onset    Diabetes Mother      Prostate cancer Maternal Grandfather       Social History[1]  Review of patient's allergies indicates:  No Known Allergies    Review of Systems   Constitutional:  Positive for malaise/fatigue.   Respiratory:  Positive for cough and shortness of breath.    Cardiovascular:  Positive for palpitations. Negative for chest pain.     Objective:       BP (!) 174/87 (BP Location: Left arm, Patient Position: Sitting)   Pulse 106   Temp 98.7 °F (37.1 °C) (Tympanic)   Ht 5' 9" (1.753 m)   Wt 87.6 kg (193 lb 2 oz)   SpO2 99%   BMI 28.52 kg/m²   Physical Exam             Physical Exam  Constitutional:       General: He is not in acute distress.     Appearance: Normal appearance. He is well-developed. He is not ill-appearing or diaphoretic.   Cardiovascular:      Rate and Rhythm: Normal rate and regular rhythm.      Heart sounds: Normal heart sounds.   Pulmonary:      Effort: Pulmonary effort is normal.      Breath sounds: Normal breath sounds.   Neurological:      General: No focal deficit present.      Mental Status: He is alert and oriented to person, place, and time.   Psychiatric:         Mood and Affect: Mood normal.         Behavior: Behavior normal.         Thought Content: Thought content normal.         Judgment: Judgment normal.         Assessment:     1. Essential hypertension    2. SOB (shortness of breath)    3. Palpitations      Plan:   Assessment & Plan    MEDICAL " DECISION MAKING:  - Evaluated response to recently started Trelegy for respiratory issues.  - Started angiotensin receptor blocker 30 mg daily for BP management after discontinuing Lisinopril due to adverse reactions.  - Reviewed recent lab results, noting elevated cholesterol, triglycerides, and liver function tests, likely related to fatty liver.  - Ruled out thyroid dysfunction as potential cause of weight gain based on normal TSH and T4.  - Evaluated slightly elevated fasting glucose (150) and normal A1C, planning to recheck in 6 months.  - Decided against diuretic therapy for BP due to outdoor work and risk of dehydration.  - Determined BNP test was low, indicating no current signs of heart failure.  - Evaluated reported palpitations and shortness of breath, discussing potential relationship with uncontrolled BP.  - Explained that current elevated BP, while not ideal long-term, is not an immediate emergency.    PATIENT EDUCATION:  - Educated on BP levels requiring emergency care (200/100).    ACTION ITEMS/LIFESTYLE:  - Mr. Padilla to monitor BP at home.  - Recommend continuing current dietary habits that have led to weight loss, ensuring meals are healthy and include vegetables.    MEDICATIONS:  - Mr. Padilla to rinse mouth with water and spit out after using Trelegy to prevent oral yeast infections.  - Started angiotensin receptor blocker 30 mg daily for BP management after discontinuing Lisinopril due to adverse reactions.  - Contact the office if experiencing any side effects from the new BP medication.    ORDERS:  - Ordered metabolic panel to reassess liver and renal function due to recent changes in alcohol tolerance.    REFERRALS:  - Referred to cardiologist for evaluation of palpitations and shortness of breath.    FOLLOW UP:  - Follow up in 2 weeks for BP recheck with nurse.  - Follow up in 6 months for A1C recheck.        Essential hypertension  -     Comprehensive Metabolic Panel; Future; Expected date:  2025    SOB (shortness of breath)  -     Ambulatory referral/consult to Cardiology; Future; Expected date: 2025    Palpitations  -     Ambulatory referral/consult to Cardiology; Future; Expected date: 2025    Other orders  -     losartan (COZAAR) 25 MG tablet; Take 1 tablet (25 mg total) by mouth once daily.  Dispense: 30 tablet; Refill: 3      Medication List with Changes/Refills   New Medications    LOSARTAN (COZAAR) 25 MG TABLET    Take 1 tablet (25 mg total) by mouth once daily.   Current Medications    ALBUTEROL SULFATE (PROAIR RESPICLICK) 90 MCG/ACTUATION INHALER    Inhale 2 puffs into the lungs every 4 (four) hours as needed for Wheezing. Rescue    ASPIRIN-CAFFEINE 845-65 MG PWPK    Take 1-2 packets by mouth daily as needed.    CYCLOBENZAPRINE (FLEXERIL) 5 MG TABLET    Take 5 mg by mouth as needed.    FLUTICASONE PROPIONATE (FLONASE) 50 MCG/ACTUATION NASAL SPRAY    1 spray (50 mcg total) by Each Nostril route once daily.    FLUTICASONE-UMECLIDIN-VILANTER (TRELEGY ELLIPTA) 100-62.5-25 MCG DSDV    Inhale 1 puff into the lungs once daily.    TIZANIDINE (ZANAFLEX) 4 MG TABLET    SMARTSI Tablet(s) By Mouth Every Evening PRN    ZONISAMIDE (ZONEGRAN) 100 MG CAP    Take 100 mg by mouth once daily.   Discontinued Medications    LISINOPRIL 10 MG TABLET    Take 1 tablet (10 mg total) by mouth once daily.       This note was generated with the assistance of ambient listening technology. Verbal consent was obtained by the patient and accompanying visitor(s) for the recording of patient appointment to facilitate this note. I attest to having reviewed and edited the generated note for accuracy, though some syntax or spelling errors may persist. Please contact the author of this note for any clarification.            [1]   Social History  Socioeconomic History    Marital status:    Tobacco Use    Smoking status: Every Day     Current packs/day: 2.00     Average packs/day: 2.0 packs/day for 33.3  years (66.6 ttl pk-yrs)     Types: Cigarettes     Start date: 1/31/1992    Smokeless tobacco: Never   Substance and Sexual Activity    Alcohol use: Yes     Alcohol/week: 84.0 standard drinks of alcohol     Types: 84 Cans of beer per week     Comment: beer 12 pack daily    Drug use: No    Sexual activity: Yes     Partners: Female

## 2025-05-13 NOTE — TELEPHONE ENCOUNTER
1st attempt to contact there pt re cardiology referral. This is within the first 48 hours time frame pbr

## 2025-05-14 ENCOUNTER — RESULTS FOLLOW-UP (OUTPATIENT)
Dept: INTERNAL MEDICINE | Facility: CLINIC | Age: 45
End: 2025-05-14
Payer: COMMERCIAL

## 2025-05-14 ENCOUNTER — TELEPHONE (OUTPATIENT)
Dept: CARDIOLOGY | Facility: CLINIC | Age: 45
End: 2025-05-14
Payer: COMMERCIAL

## 2025-05-14 NOTE — PROGRESS NOTES
Pulmonary Disease Management  [facility]  Chronic Care Management  Initial Visit       Diagnosis: COPD SUGGESTED ON INITIAL EVALUATION  Last Hospital Admission: NA  Last provider visit: 25      Current Outpatient Medications:     albuterol sulfate (PROAIR RESPICLICK) 90 mcg/actuation inhaler, Inhale 2 puffs into the lungs every 4 (four) hours as needed for Wheezing. Rescue, Disp: 1 each, Rfl: 5    aspirin-caffeine 845-65 mg PwPk, Take 1-2 packets by mouth daily as needed., Disp: , Rfl:     cyclobenzaprine (FLEXERIL) 5 MG tablet, Take 5 mg by mouth as needed., Disp: , Rfl:     fluticasone propionate (FLONASE) 50 mcg/actuation nasal spray, 1 spray (50 mcg total) by Each Nostril route once daily., Disp: 16 g, Rfl: 5    fluticasone-umeclidin-vilanter (TRELEGY ELLIPTA) 100-62.5-25 mcg DsDv, Inhale 1 puff into the lungs once daily., Disp: 60 each, Rfl: 5    losartan (COZAAR) 25 MG tablet, Take 1 tablet (25 mg total) by mouth once daily., Disp: 30 tablet, Rfl: 3    tiZANidine (ZANAFLEX) 4 MG tablet, SMARTSI Tablet(s) By Mouth Every Evening PRN, Disp: , Rfl:     zonisamide (ZONEGRAN) 100 MG Cap, Take 100 mg by mouth once daily., Disp: , Rfl:     Review of patient's allergies indicates:  No Known Allergies    Smoking history: Tobacco Use History[1]                                                                                                                     PFT Results:  Pre FVC   Date/Time Value Ref Range Status   2025 04:38 PM 4.02 3.89 - 5.97 L Final     Pre FEV1   Date/Time Value Ref Range Status   2025 04:38 PM 3.37 3.10 - 4.77 L Final     Pre FEV1 FVC   Date/Time Value Ref Range Status   2025 04:38 PM 83.94 69.43 - 90.65 % Final     Pre TLC   Date/Time Value Ref Range Status   2025 04:38 PM 5.36 (L) 5.75 - 8.05 L Final     Pre DLCO   Date/Time Value Ref Range Status   2025 04:38 PM 15.71 (L) 24.25 - 38.11 ml/(min*mmHg) Final            Patient Concerns or  Expectations:   NA  Therapist Comments:   Pravin Padilla  was seen 5/13/2025  7:46 AM in the Pulmonary Disease management clinic for evaluation, education, reinforcement of self management techniques and exacerbation action plan.    Viridiana Stout    Past Medical History:   Diagnosis Date    Alcohol abuse                  Educational assessment:   [x]            Good  []            Fair  []            Poor    Readiness to learn:   [x]            Good  []            Fair  []            Poor    Vision Status:   [x]            Good  []            Fair  []            Poor    Reading Ability:  [x]            Good  []            Fair  []            Poor    Knowledge of condition:   [x]            Good  []            Fair  []            Poor    Language Barriers:   []            Good  []            Fair  []            Poor  [x]            None    Cognitive/ Physical Barriers:   []            Good  []            Fair  []            Poor  [x]            None    Learning best by:                       [x]            Seeing  [x]            Hearing  [x]            Reading                         [x]            Doing    Describe any barrier /Limitation or financial implications of care choices identified     []            Financial  []            Emotional  []            Education  []            Vision/Hearing  []            Physical  [x]            None  []                TOPIC /CONTENT FOR TODAY:    [x]            MDI with or without spacer  [x]            Dry powder inhaler  []            Acapella   []           Peak Flow meter  [x]            COPD action plan  []            Nebulizer use  []            Oxygen use safety  [x]            Breathing and cough techniques  []            Energy conservation  []            Infection prevention  [x]            Asthma trigger checklist        Learner:    [x]            Patient   []            Caregiver    Method:    [x]            Verbal explanation  []            Audio  visual    [x]            Literature  [x]            Teach back      Evaluation:    [x]            Teach back  [x]            Demonstrate  [x]            Follow up phone call    []            2 weeks     [x]            4 weeks   []            PRN    Additional comments:   Patient was seen today to review respiratory medication purpose and proper technique for use of inhalers. Patient practiced proper technique using MDI with valved holding chamber (spacer) and DPI inhalers. Patient demonstrated understanding. Literature was given to patient.     Reviewed the difference in controller (TRELEGY) and rescue (ALBUTEROL) medications.    Asthma trigger checklist was verbally reviewed and literature given to patient.     Infection prevention was discussed. Patient was reminded to get vaccines. Hand hygiene and cleaning of respiratory equipment was also discussed. Patient verbalized understanding.     Reviewed breathing techniques such as pursed-lip breathing, morris-cough technique, and diaphragmatic breathing. Patient practiced proper technique and verbalized understanding. Literature given to patient.      COPD action plan was reviewed and literature was given to patient. Patient verbalized understanding.       Plan:  Monthly Pulmonary Disease Management Questionnaire  Reinforce education  Meds: TRELEGY, ALBUTEROL  DME Needs: NA  Action Plan: COPD  Immunization: Pneumococcal- DUE, Flu-DUE  Next Provider Visit: 8/18/25  Next Spirometry/CPFT: NOT SCHEDULED  Approximate time spent with patient: 45 MINUTES         [1]   Social History  Tobacco Use   Smoking Status Every Day    Current packs/day: 2.00    Average packs/day: 2.0 packs/day for 33.3 years (66.6 ttl pk-yrs)    Types: Cigarettes    Start date: 1/31/1992   Smokeless Tobacco Never

## 2025-05-14 NOTE — TELEPHONE ENCOUNTER
Re cardiology referral . Pt was offered sooner appt states that he would like to be seen on the appt that was schedule pbr

## 2025-05-14 NOTE — PATIENT INSTRUCTIONS
ACTION PLAN    GREEN ZONE  My sputum is clear/white/usual color and easily cleared.  My breathing is no harder than usual.  I can do my usual activities.  I can think clearly.   Take your usual medicines, including oxygen, as you are told to do so by your health care provider.   YELLOW ZONE  My sputum has change (color, thickness, amount).  I am more short of breath than usual.  I cough or wheeze more.  I weigh more and my legs/feet swell.  I cannot do my usual activities without resting.   Call your health care provider. You will probably be told to begin taking an antibiotic and prednisone. Have your pharmacy phone number available.   RED ZONE  I cannot cough out my sputum.  I am much more short of breath than normal.  I need to sit up to breathe  I cannot do my usual activities.  I am unable to speak more than one or two words at a time.  I am confused.   Call your health care provider. You may be asked to come in to be seen, told to go to the emergency room, or told to call 9-1-1.

## 2025-05-16 ENCOUNTER — HOSPITAL ENCOUNTER (OUTPATIENT)
Dept: RADIOLOGY | Facility: HOSPITAL | Age: 45
Discharge: HOME | End: 2025-05-16
Attending: INTERNAL MEDICINE
Payer: COMMERCIAL

## 2025-05-16 DIAGNOSIS — J44.9 CHRONIC OBSTRUCTIVE PULMONARY DISEASE, UNSPECIFIED COPD TYPE: ICD-10-CM

## 2025-05-16 PROCEDURE — 71250 CT THORAX DX C-: CPT | Mod: TC

## 2025-05-16 PROCEDURE — 71250 CT THORAX DX C-: CPT | Mod: 26,,, | Performed by: STUDENT IN AN ORGANIZED HEALTH CARE EDUCATION/TRAINING PROGRAM

## 2025-05-18 ENCOUNTER — RESULTS FOLLOW-UP (OUTPATIENT)
Dept: PULMONOLOGY | Facility: HOSPITAL | Age: 45
End: 2025-05-18

## 2025-05-22 DIAGNOSIS — R06.02 SHORTNESS OF BREATH: Primary | ICD-10-CM

## 2025-05-26 ENCOUNTER — OFFICE VISIT (OUTPATIENT)
Dept: CARDIOLOGY | Facility: CLINIC | Age: 45
End: 2025-05-26
Payer: COMMERCIAL

## 2025-05-26 ENCOUNTER — HOSPITAL ENCOUNTER (OUTPATIENT)
Dept: CARDIOLOGY | Facility: HOSPITAL | Age: 45
Discharge: HOME | End: 2025-05-26
Attending: INTERNAL MEDICINE
Payer: COMMERCIAL

## 2025-05-26 VITALS
RESPIRATION RATE: 16 BRPM | HEART RATE: 96 BPM | WEIGHT: 190.81 LBS | OXYGEN SATURATION: 99 % | HEIGHT: 69 IN | DIASTOLIC BLOOD PRESSURE: 90 MMHG | SYSTOLIC BLOOD PRESSURE: 137 MMHG | BODY MASS INDEX: 28.26 KG/M2

## 2025-05-26 DIAGNOSIS — R00.2 PALPITATIONS: ICD-10-CM

## 2025-05-26 DIAGNOSIS — I10 PRIMARY HYPERTENSION: Primary | ICD-10-CM

## 2025-05-26 DIAGNOSIS — F10.10 ALCOHOL ABUSE: ICD-10-CM

## 2025-05-26 DIAGNOSIS — R06.02 SOB (SHORTNESS OF BREATH): ICD-10-CM

## 2025-05-26 DIAGNOSIS — F17.200 SMOKER: ICD-10-CM

## 2025-05-26 DIAGNOSIS — R06.02 SHORTNESS OF BREATH: ICD-10-CM

## 2025-05-26 LAB
OHS QRS DURATION: 92 MS
OHS QTC CALCULATION: 413 MS

## 2025-05-26 PROCEDURE — 3075F SYST BP GE 130 - 139MM HG: CPT | Mod: CPTII,S$GLB,, | Performed by: INTERNAL MEDICINE

## 2025-05-26 PROCEDURE — 99999 PR PBB SHADOW E&M-EST. PATIENT-LVL IV: CPT | Mod: PBBFAC,,, | Performed by: INTERNAL MEDICINE

## 2025-05-26 PROCEDURE — 4010F ACE/ARB THERAPY RXD/TAKEN: CPT | Mod: CPTII,S$GLB,, | Performed by: INTERNAL MEDICINE

## 2025-05-26 PROCEDURE — 93010 ELECTROCARDIOGRAM REPORT: CPT | Mod: ,,, | Performed by: INTERNAL MEDICINE

## 2025-05-26 PROCEDURE — 93005 ELECTROCARDIOGRAM TRACING: CPT

## 2025-05-26 PROCEDURE — 3080F DIAST BP >= 90 MM HG: CPT | Mod: CPTII,S$GLB,, | Performed by: INTERNAL MEDICINE

## 2025-05-26 PROCEDURE — 99205 OFFICE O/P NEW HI 60 MIN: CPT | Mod: S$GLB,,, | Performed by: INTERNAL MEDICINE

## 2025-05-26 PROCEDURE — 3008F BODY MASS INDEX DOCD: CPT | Mod: CPTII,S$GLB,, | Performed by: INTERNAL MEDICINE

## 2025-05-26 PROCEDURE — 3044F HG A1C LEVEL LT 7.0%: CPT | Mod: CPTII,S$GLB,, | Performed by: INTERNAL MEDICINE

## 2025-05-26 PROCEDURE — 1159F MED LIST DOCD IN RCRD: CPT | Mod: CPTII,S$GLB,, | Performed by: INTERNAL MEDICINE

## 2025-05-26 NOTE — PROGRESS NOTES
Subjective:   Patient ID:  Pravin Padilla is a 45 y.o. male who presents for follow-up of No chief complaint on file.  Pt with SOB and palpitations x few months. Pt with new dx HTN on losartan  EKG is normal    Shortness of Breath  This is a new problem. The current episode started more than 1 month ago. The problem occurs intermittently. The problem has been waxing and waning. Pertinent negatives include no chest pain or leg swelling. The symptoms are aggravated by any activity. The patient has no known risk factors for DVT/PE. He has tried rest and beta agonist inhalers for the symptoms. His past medical history is significant for asthma.   Palpitations   This is a new problem. The current episode started more than 1 month ago. The problem occurs intermittently. The problem has been waxing and waning. Nothing aggravates the symptoms. Associated symptoms include shortness of breath. Pertinent negatives include no chest pain or dizziness. He has tried bed rest for the symptoms. The treatment provided mild relief. Risk factors include being male and smoking/tobacco exposure.   Hypertension  This is a chronic problem. The current episode started more than 1 year ago. The problem has been gradually improving since onset. The problem is controlled. Pertinent negatives include no chest pain, palpitations or shortness of breath. Past treatments include  angiotensin blockers. The current treatment provides moderate improvement. There are no compliance problems.     CRF- male, smoker, HTN  Review of Systems   Constitutional: Negative. Negative for weight gain.   HENT: Negative.     Eyes: Negative.    Cardiovascular:  Positive for palpitations. Negative for chest pain and leg swelling.   Respiratory:  Positive for shortness of breath.    Endocrine: Negative.    Hematologic/Lymphatic: Negative.    Skin: Negative.    Musculoskeletal:  Negative for muscle weakness.   Gastrointestinal: Negative.    Genitourinary: Negative.     Neurological: Negative.  Negative for dizziness.   Psychiatric/Behavioral: Negative.     Allergic/Immunologic: Negative.    All other systems reviewed and are negative.    Family History   Problem Relation Name Age of Onset    Diabetes Mother      Prostate cancer Maternal Grandfather       Past Medical History:   Diagnosis Date    Alcohol abuse      Social History[1]  Medications Ordered Prior to Encounter[2]  Review of patient's allergies indicates:  No Known Allergies    Objective:     Physical Exam  Vitals and nursing note reviewed.   Constitutional:       Appearance: He is well-developed.   HENT:      Head: Normocephalic and atraumatic.   Eyes:      Conjunctiva/sclera: Conjunctivae normal.      Pupils: Pupils are equal, round, and reactive to light.   Cardiovascular:      Rate and Rhythm: Normal rate and regular rhythm.      Pulses: Intact distal pulses.      Heart sounds: Normal heart sounds.   Pulmonary:      Effort: Pulmonary effort is normal.      Breath sounds: Normal breath sounds.   Abdominal:      General: Bowel sounds are normal.      Palpations: Abdomen is soft.   Musculoskeletal:      Cervical back: Normal range of motion and neck supple.   Skin:     General: Skin is warm and dry.   Neurological:      Mental Status: He is alert and oriented to person, place, and time.         Assessment:     1. Primary hypertension    2. SOB (shortness of breath)    3. Palpitations    4. Smoker    5. Alcohol abuse        Plan:     Primary hypertension    SOB (shortness of breath)  -     Ambulatory referral/consult to Cardiology    Palpitations  -     Ambulatory referral/consult to Cardiology    Smoker    Alcohol abuse      Echo, stress test, cardiac monitor  Continue losartan-HTN  Check lipids       [1]   Social History  Socioeconomic History    Marital status:    Tobacco Use    Smoking status: Every Day     Current packs/day: 2.00     Average packs/day: 2.0 packs/day for 33.3 years (66.6 ttl pk-yrs)      Types: Cigarettes     Start date: 1992    Smokeless tobacco: Never   Substance and Sexual Activity    Alcohol use: Yes     Alcohol/week: 84.0 standard drinks of alcohol     Types: 84 Cans of beer per week     Comment: beer 12 pack daily    Drug use: No    Sexual activity: Yes     Partners: Female   [2]   Current Outpatient Medications on File Prior to Visit   Medication Sig Dispense Refill    albuterol sulfate (PROAIR RESPICLICK) 90 mcg/actuation inhaler Inhale 2 puffs into the lungs every 4 (four) hours as needed for Wheezing. Rescue 1 each 5    aspirin-caffeine 845-65 mg PwPk Take 1-2 packets by mouth daily as needed.      fluticasone propionate (FLONASE) 50 mcg/actuation nasal spray 1 spray (50 mcg total) by Each Nostril route once daily. 16 g 5    fluticasone-umeclidin-vilanter (TRELEGY ELLIPTA) 100-62.5-25 mcg DsDv Inhale 1 puff into the lungs once daily. 60 each 5    losartan (COZAAR) 25 MG tablet Take 1 tablet (25 mg total) by mouth once daily. 30 tablet 3    tiZANidine (ZANAFLEX) 4 MG tablet SMARTSI Tablet(s) By Mouth Every Evening PRN      zonisamide (ZONEGRAN) 100 MG Cap Take 100 mg by mouth once daily.      cyclobenzaprine (FLEXERIL) 5 MG tablet Take 5 mg by mouth as needed. (Patient not taking: Reported on 2025)       No current facility-administered medications on file prior to visit.

## 2025-06-04 ENCOUNTER — HOSPITAL ENCOUNTER (OUTPATIENT)
Dept: CARDIOLOGY | Facility: HOSPITAL | Age: 45
Discharge: HOME | End: 2025-06-04
Attending: INTERNAL MEDICINE
Payer: COMMERCIAL

## 2025-06-04 VITALS
SYSTOLIC BLOOD PRESSURE: 137 MMHG | HEIGHT: 69 IN | DIASTOLIC BLOOD PRESSURE: 90 MMHG | WEIGHT: 190 LBS | BODY MASS INDEX: 28.14 KG/M2

## 2025-06-04 DIAGNOSIS — R06.02 SOB (SHORTNESS OF BREATH): ICD-10-CM

## 2025-06-04 DIAGNOSIS — R00.2 PALPITATIONS: ICD-10-CM

## 2025-06-04 LAB
AORTIC ROOT ANNULUS: 2.9 CM
AORTIC SIZE INDEX (SOV): 1.5 CM/M2
AORTIC SIZE INDEX: 1.5 CM/M2
ASCENDING AORTA: 3 CM
AV INDEX (PROSTH): 0.82
AV MEAN GRADIENT: 3 MMHG
AV PEAK GRADIENT: 7 MMHG
AV VALVE AREA BY VELOCITY RATIO: 2.9 CM²
AV VALVE AREA: 2.8 CM²
AV VELOCITY RATIO: 0.85
BSA FOR ECHO PROCEDURE: 2.05 M2
CV ECHO LV RWT: 0.31 CM
CV STRESS BASE HR: 90 BPM
DIASTOLIC BLOOD PRESSURE: 79 MMHG
DOP CALC AO PEAK VEL: 1.3 M/S
DOP CALC AO VTI: 22.6 CM
DOP CALC LVOT AREA: 3.5 CM2
DOP CALC LVOT DIAMETER: 2.1 CM
DOP CALC LVOT PEAK VEL: 1.1 M/S
DOP CALC LVOT STROKE VOLUME: 64 CM3
DOP CALC RVOT PEAK VEL: 0.75 M/S
DOP CALC RVOT VTI: 17.2 CM
DOP CALCLVOT PEAK VEL VTI: 18.5 CM
E WAVE DECELERATION TIME: 161 MSEC
E/A RATIO: 0.92
E/E' RATIO: 5 M/S
ECHO LV POSTERIOR WALL: 0.9 CM (ref 0.6–1.1)
EJECTION FRACTION: 60 %
FRACTIONAL SHORTENING: 32.8 % (ref 28–44)
INTERVENTRICULAR SEPTUM: 1 CM (ref 0.6–1.1)
IVC DIAMETER: 1.54 CM
IVRT: 88 MSEC
LA MAJOR: 4.7 CM
LA MINOR: 4.2 CM
LA WIDTH: 3.4 CM
LEFT ATRIUM AREA SYSTOLIC (APICAL 2 CHAMBER): 12.52 CM2
LEFT ATRIUM AREA SYSTOLIC (APICAL 4 CHAMBER): 15.03 CM2
LEFT ATRIUM SIZE: 3.7 CM
LEFT ATRIUM VOLUME INDEX MOD: 17 ML/M2
LEFT ATRIUM VOLUME INDEX: 23 ML/M2
LEFT ATRIUM VOLUME MOD: 35 ML
LEFT ATRIUM VOLUME: 47 CM3
LEFT INTERNAL DIMENSION IN SYSTOLE: 3.9 CM (ref 2.1–4)
LEFT VENTRICLE DIASTOLIC VOLUME INDEX: 81.68 ML/M2
LEFT VENTRICLE DIASTOLIC VOLUME: 165 ML
LEFT VENTRICLE END SYSTOLIC VOLUME APICAL 2 CHAMBER: 29.55 ML
LEFT VENTRICLE END SYSTOLIC VOLUME APICAL 4 CHAMBER: 34.22 ML
LEFT VENTRICLE MASS INDEX: 108 G/M2
LEFT VENTRICLE SYSTOLIC VOLUME INDEX: 33.7 ML/M2
LEFT VENTRICLE SYSTOLIC VOLUME: 68 ML
LEFT VENTRICULAR INTERNAL DIMENSION IN DIASTOLE: 5.8 CM (ref 3.5–6)
LEFT VENTRICULAR MASS: 218.1 G
LV LATERAL E/E' RATIO: 4 M/S
LV SEPTAL E/E' RATIO: 7.3 M/S
LVED V (TEICH): 165.22 ML
LVES V (TEICH): 67.54 ML
LVOT MG: 2.55 MMHG
LVOT MV: 0.74 CM/S
MV PEAK A VEL: 0.87 M/S
MV PEAK E VEL: 0.8 M/S
MV STENOSIS PRESSURE HALF TIME: 46.55 MS
MV VALVE AREA P 1/2 METHOD: 4.73 CM2
OHS CV CPX 1 MINUTE RECOVERY HEART RATE: 153 BPM
OHS CV CPX 85 PERCENT MAX PREDICTED HEART RATE MALE: 149
OHS CV CPX ESTIMATED METS: 5
OHS CV CPX MAX PREDICTED HEART RATE: 175
OHS CV CPX PATIENT IS FEMALE: 0
OHS CV CPX PATIENT IS MALE: 1
OHS CV CPX PEAK DIASTOLIC BLOOD PRESSURE: 95 MMHG
OHS CV CPX PEAK HEAR RATE: 157 BPM
OHS CV CPX PEAK RATE PRESSURE PRODUCT: NORMAL
OHS CV CPX PEAK SYSTOLIC BLOOD PRESSURE: 192 MMHG
OHS CV CPX PERCENT MAX PREDICTED HEART RATE ACHIEVED: 90
OHS CV CPX RATE PRESSURE PRODUCT PRESENTING: NORMAL
OHS CV RV/LV RATIO: 0.6 CM
PULM VEIN S/D RATIO: 1.28
PV MEAN GRADIENT: 1 MMHG
PV MV: 0.88 M/S
PV PEAK D VEL: 0.5 M/S
PV PEAK GRADIENT: 7 MMHG
PV PEAK S VEL: 0.64 M/S
PV PEAK VELOCITY: 1.35 M/S
RA MAJOR: 4.74 CM
RA PRESSURE ESTIMATED: 3 MMHG
RA WIDTH: 3.4 CM
RIGHT VENTRICLE DIASTOLIC BASEL DIMENSION: 3.5 CM
RIGHT VENTRICULAR END-DIASTOLIC DIMENSION: 3.46 CM
SINUS: 3.01 CM
STJ: 3 CM
STRESS ECHO POST EXERCISE DUR MIN: 9 MINUTES
STRESS ECHO POST EXERCISE DUR SEC: 1 SECONDS
SYSTOLIC BLOOD PRESSURE: 117 MMHG
TDI LATERAL: 0.2 M/S
TDI SEPTAL: 0.11 M/S
TDI: 0.16 M/S
TRICUSPID ANNULAR PLANE SYSTOLIC EXCURSION: 1.9 CM
Z-SCORE OF LEFT VENTRICULAR DIMENSION IN END DIASTOLE: -0.26
Z-SCORE OF LEFT VENTRICULAR DIMENSION IN END SYSTOLE: 0.52

## 2025-06-04 PROCEDURE — 93306 TTE W/DOPPLER COMPLETE: CPT | Mod: 26,,, | Performed by: INTERNAL MEDICINE

## 2025-06-04 PROCEDURE — 93017 CV STRESS TEST TRACING ONLY: CPT

## 2025-06-04 PROCEDURE — 93018 CV STRESS TEST I&R ONLY: CPT | Mod: ,,, | Performed by: INTERNAL MEDICINE

## 2025-06-04 PROCEDURE — 93306 TTE W/DOPPLER COMPLETE: CPT

## 2025-06-04 PROCEDURE — 93016 CV STRESS TEST SUPVJ ONLY: CPT | Mod: ,,, | Performed by: INTERNAL MEDICINE

## 2025-06-05 ENCOUNTER — RESULTS FOLLOW-UP (OUTPATIENT)
Dept: CARDIOLOGY | Facility: HOSPITAL | Age: 45
End: 2025-06-05

## 2025-07-29 ENCOUNTER — PATIENT MESSAGE (OUTPATIENT)
Dept: ADMINISTRATIVE | Facility: HOSPITAL | Age: 45
End: 2025-07-29
Payer: COMMERCIAL

## 2025-08-28 ENCOUNTER — OFFICE VISIT (OUTPATIENT)
Dept: INTERNAL MEDICINE | Facility: CLINIC | Age: 45
End: 2025-08-28
Payer: COMMERCIAL

## 2025-08-28 VITALS
RESPIRATION RATE: 20 BRPM | HEIGHT: 66 IN | DIASTOLIC BLOOD PRESSURE: 68 MMHG | HEART RATE: 86 BPM | TEMPERATURE: 98 F | BODY MASS INDEX: 28.56 KG/M2 | SYSTOLIC BLOOD PRESSURE: 110 MMHG | WEIGHT: 177.69 LBS | OXYGEN SATURATION: 98 %

## 2025-08-28 DIAGNOSIS — I10 PRIMARY HYPERTENSION: Primary | ICD-10-CM

## 2025-08-28 DIAGNOSIS — Z12.11 COLON CANCER SCREENING: ICD-10-CM

## 2025-08-28 DIAGNOSIS — F17.200 SMOKER: ICD-10-CM

## 2025-08-28 PROCEDURE — 3008F BODY MASS INDEX DOCD: CPT | Mod: CPTII,S$GLB,, | Performed by: PHYSICIAN ASSISTANT

## 2025-08-28 PROCEDURE — 3074F SYST BP LT 130 MM HG: CPT | Mod: CPTII,S$GLB,, | Performed by: PHYSICIAN ASSISTANT

## 2025-08-28 PROCEDURE — 99999 PR PBB SHADOW E&M-EST. PATIENT-LVL V: CPT | Mod: PBBFAC,,, | Performed by: PHYSICIAN ASSISTANT

## 2025-08-28 PROCEDURE — 3044F HG A1C LEVEL LT 7.0%: CPT | Mod: CPTII,S$GLB,, | Performed by: PHYSICIAN ASSISTANT

## 2025-08-28 PROCEDURE — 1160F RVW MEDS BY RX/DR IN RCRD: CPT | Mod: CPTII,S$GLB,, | Performed by: PHYSICIAN ASSISTANT

## 2025-08-28 PROCEDURE — 4010F ACE/ARB THERAPY RXD/TAKEN: CPT | Mod: CPTII,S$GLB,, | Performed by: PHYSICIAN ASSISTANT

## 2025-08-28 PROCEDURE — 99213 OFFICE O/P EST LOW 20 MIN: CPT | Mod: S$GLB,,, | Performed by: PHYSICIAN ASSISTANT

## 2025-08-28 PROCEDURE — 3078F DIAST BP <80 MM HG: CPT | Mod: CPTII,S$GLB,, | Performed by: PHYSICIAN ASSISTANT

## 2025-08-28 PROCEDURE — 1159F MED LIST DOCD IN RCRD: CPT | Mod: CPTII,S$GLB,, | Performed by: PHYSICIAN ASSISTANT

## 2025-08-28 RX ORDER — LOSARTAN POTASSIUM 25 MG/1
25 TABLET ORAL DAILY
Qty: 90 TABLET | Refills: 3 | Status: SHIPPED | OUTPATIENT
Start: 2025-08-28 | End: 2026-08-28

## 2025-08-30 ENCOUNTER — HOSPITAL ENCOUNTER (OUTPATIENT)
Dept: RADIOLOGY | Facility: HOSPITAL | Age: 45
Discharge: HOME | End: 2025-08-30
Attending: PHYSICIAN ASSISTANT
Payer: COMMERCIAL

## 2025-08-30 DIAGNOSIS — M54.2 CERVICAL PAIN: ICD-10-CM

## 2025-08-30 PROCEDURE — 72141 MRI NECK SPINE W/O DYE: CPT | Mod: 26,,, | Performed by: RADIOLOGY

## 2025-08-30 PROCEDURE — 72141 MRI NECK SPINE W/O DYE: CPT | Mod: TC
